# Patient Record
Sex: FEMALE | Race: WHITE | NOT HISPANIC OR LATINO | Employment: OTHER | URBAN - METROPOLITAN AREA
[De-identification: names, ages, dates, MRNs, and addresses within clinical notes are randomized per-mention and may not be internally consistent; named-entity substitution may affect disease eponyms.]

---

## 2018-04-15 PROCEDURE — 99285 EMERGENCY DEPT VISIT HI MDM: CPT

## 2018-04-16 ENCOUNTER — HOSPITAL ENCOUNTER (EMERGENCY)
Facility: HOSPITAL | Age: 83
Discharge: HOME | End: 2018-04-16
Attending: FAMILY MEDICINE
Payer: MEDICARE

## 2018-04-16 VITALS
SYSTOLIC BLOOD PRESSURE: 118 MMHG | OXYGEN SATURATION: 99 % | WEIGHT: 145 LBS | TEMPERATURE: 99 F | RESPIRATION RATE: 18 BRPM | HEART RATE: 87 BPM | DIASTOLIC BLOOD PRESSURE: 64 MMHG | BODY MASS INDEX: 24.89 KG/M2

## 2018-04-16 DIAGNOSIS — F10.920 ALCOHOLIC INTOXICATION WITHOUT COMPLICATION: ICD-10-CM

## 2018-04-16 DIAGNOSIS — S40.021A CONTUSION OF ARM, RIGHT, INITIAL ENCOUNTER: ICD-10-CM

## 2018-04-16 DIAGNOSIS — S00.83XA FACIAL CONTUSION, INITIAL ENCOUNTER: Primary | ICD-10-CM

## 2018-04-16 LAB
ALBUMIN SERPL BCP-MCNC: 3.9 G/DL
ALP SERPL-CCNC: 64 U/L
ALT SERPL W/O P-5'-P-CCNC: 27 U/L
ANION GAP SERPL CALC-SCNC: 10 MMOL/L
AST SERPL-CCNC: 35 U/L
BACTERIA #/AREA URNS HPF: NORMAL /HPF
BASOPHILS # BLD AUTO: 0.09 K/UL
BASOPHILS NFR BLD: 1.2 %
BILIRUB SERPL-MCNC: 0.4 MG/DL
BILIRUB UR QL STRIP: NEGATIVE
BUN SERPL-MCNC: 15 MG/DL
CALCIUM SERPL-MCNC: 9.4 MG/DL
CHLORIDE SERPL-SCNC: 106 MMOL/L
CLARITY UR: CLEAR
CO2 SERPL-SCNC: 27 MMOL/L
COLOR UR: YELLOW
CREAT SERPL-MCNC: 0.5 MG/DL
DIFFERENTIAL METHOD: NORMAL
EOSINOPHIL # BLD AUTO: 0.1 K/UL
EOSINOPHIL NFR BLD: 1.9 %
ERYTHROCYTE [DISTWIDTH] IN BLOOD BY AUTOMATED COUNT: 13.4 %
EST. GFR  (AFRICAN AMERICAN): >60 ML/MIN/1.73 M^2
EST. GFR  (NON AFRICAN AMERICAN): >60 ML/MIN/1.73 M^2
ETHANOL SERPL-MCNC: 352 MG/DL
GLUCOSE SERPL-MCNC: 105 MG/DL
GLUCOSE UR QL STRIP: NEGATIVE
HCT VFR BLD AUTO: 42.2 %
HGB BLD-MCNC: 14 G/DL
HGB UR QL STRIP: ABNORMAL
IMM GRANULOCYTES # BLD AUTO: 0.02 K/UL
IMM GRANULOCYTES NFR BLD AUTO: 0.3 %
KETONES UR QL STRIP: NEGATIVE
LEUKOCYTE ESTERASE UR QL STRIP: NEGATIVE
LYMPHOCYTES # BLD AUTO: 2.3 K/UL
LYMPHOCYTES NFR BLD: 31.2 %
MCH RBC QN AUTO: 31 PG
MCHC RBC AUTO-ENTMCNC: 33.2 G/DL
MCV RBC AUTO: 94 FL
MICROSCOPIC COMMENT: NORMAL
MONOCYTES # BLD AUTO: 0.7 K/UL
MONOCYTES NFR BLD: 9.8 %
NEUTROPHILS # BLD AUTO: 4.1 K/UL
NEUTROPHILS NFR BLD: 55.6 %
NITRITE UR QL STRIP: NEGATIVE
NRBC BLD-RTO: 0 /100 WBC
PH UR STRIP: 6 [PH] (ref 5–8)
PLATELET # BLD AUTO: 239 K/UL
PMV BLD AUTO: 10 FL
POTASSIUM SERPL-SCNC: 3.5 MMOL/L
PROT SERPL-MCNC: 6.8 G/DL
PROT UR QL STRIP: NEGATIVE
RBC # BLD AUTO: 4.51 M/UL
RBC #/AREA URNS HPF: 2 /HPF (ref 0–4)
SODIUM SERPL-SCNC: 143 MMOL/L
SP GR UR STRIP: <=1.005 (ref 1–1.03)
SQUAMOUS #/AREA URNS HPF: 2 /HPF
URN SPEC COLLECT METH UR: ABNORMAL
UROBILINOGEN UR STRIP-ACNC: NEGATIVE EU/DL
WBC # BLD AUTO: 7.31 K/UL

## 2018-04-16 PROCEDURE — 85025 COMPLETE CBC W/AUTO DIFF WBC: CPT

## 2018-04-16 PROCEDURE — 80053 COMPREHEN METABOLIC PANEL: CPT

## 2018-04-16 PROCEDURE — 81000 URINALYSIS NONAUTO W/SCOPE: CPT

## 2018-04-16 PROCEDURE — 80320 DRUG SCREEN QUANTALCOHOLS: CPT

## 2018-04-16 PROCEDURE — 25000003 PHARM REV CODE 250: Performed by: FAMILY MEDICINE

## 2018-04-16 RX ORDER — SODIUM CHLORIDE 9 MG/ML
1000 INJECTION, SOLUTION INTRAVENOUS
Status: COMPLETED | OUTPATIENT
Start: 2018-04-16 | End: 2018-04-16

## 2018-04-16 RX ADMIN — SODIUM CHLORIDE 1000 ML: 0.9 INJECTION, SOLUTION INTRAVENOUS at 12:04

## 2018-04-16 NOTE — ED PROVIDER NOTES
"Encounter Date: 4/15/2018       History   No chief complaint on file.    85 yo female was drinking vodka tonight and fell on face, no LOC, no N/V/D, no headache, no chills, no chest pain, no cough, pt's  is with her now and states that she "only had one airplane bottle of vodka" that I could find(he pulled one empty one from her purse)          Review of patient's allergies indicates:   Allergen Reactions    Dilantin [phenytoin sodium extended] Rash     Past Medical History:   Diagnosis Date    Seizures      Past Surgical History:   Procedure Laterality Date    ABDOMINAL HERNIA REPAIR      APPENDECTOMY      HERNIA REPAIR       No family history on file.  Social History   Substance Use Topics    Smoking status: Former Smoker    Smokeless tobacco: Not on file    Alcohol use 2.4 oz/week     4 Glasses of wine per week     Review of Systems   Constitutional: Negative for fever.   HENT: Negative for dental problem, ear discharge, ear pain and sore throat.    Eyes: Negative for pain.   Respiratory: Negative for shortness of breath.    Cardiovascular: Negative for chest pain.   Gastrointestinal: Negative for nausea.   Genitourinary: Negative for dysuria.   Musculoskeletal: Negative for back pain.   Skin: Negative for rash.   Neurological: Negative for weakness.   Hematological: Does not bruise/bleed easily.       Physical Exam     Initial Vitals [04/16/18 0005]   BP Pulse Resp Temp SpO2   118/64 72 18 98.7 °F (37.1 °C) 99 %      MAP       82         Physical Exam    Nursing note and vitals reviewed.  Constitutional: She appears well-developed and well-nourished. She is not diaphoretic. No distress.   HENT:   Head: Normocephalic and atraumatic.   Right Ear: External ear normal.   Left Ear: External ear normal.   Contusions to face, upper lip, teeth are intact   Eyes: Pupils are equal, round, and reactive to light. Right eye exhibits no discharge. Left eye exhibits no discharge.   Neck: No tracheal deviation " present. No JVD present.   Cardiovascular: Exam reveals no friction rub.    No murmur heard.  Pulmonary/Chest: No stridor. No respiratory distress. She has no wheezes. She has no rales.   Abdominal: Bowel sounds are normal. She exhibits no distension.   Musculoskeletal: Normal range of motion.   Neurological: She is alert.   Skin: Skin is warm.   Psychiatric: She has a normal mood and affect.         ED Course   Procedures  Labs Reviewed   ALCOHOL,MEDICAL (ETHANOL) - Abnormal; Notable for the following:        Result Value    Alcohol, Medical, Serum 352 (*)     All other components within normal limits    Narrative:     Etoh called to Cornelius Thomas rn, 04/16/2018 02:10   URINALYSIS - Abnormal; Notable for the following:     Occult Blood UA 2+ (*)     All other components within normal limits   CBC W/ AUTO DIFFERENTIAL   COMPREHENSIVE METABOLIC PANEL   URINALYSIS MICROSCOPIC             Medical Decision Making:   Clinical Tests:   Lab Tests: Ordered and Reviewed  ED Management:  Pt improved steadily in the ed,  indicates he can care for pt    Additional MDM:   Differential Diagnosis:   Other: The following diagnoses were also considered and will be evaluated: intracranial hemorrhage, contusions and drug overdose.                    Clinical Impression:   The primary encounter diagnosis was Facial contusion, initial encounter. Diagnoses of Alcoholic intoxication without complication and Contusion of arm, right, initial encounter were also pertinent to this visit.                           Fercho Bright MD  04/16/18 0316       Fercho Bright MD  04/16/18 0335       Fercho Bright MD  04/16/18 0337       Fercho Bright MD  04/16/18 0340

## 2021-01-15 ENCOUNTER — IMMUNIZATION (OUTPATIENT)
Dept: FAMILY MEDICINE | Facility: CLINIC | Age: 86
End: 2021-01-15
Payer: MEDICARE

## 2021-01-15 DIAGNOSIS — Z23 NEED FOR VACCINATION: Primary | ICD-10-CM

## 2021-01-15 PROCEDURE — 0011A COVID-19, MRNA, LNP-S, PF, 100 MCG/0.5 ML DOSE VACCINE: ICD-10-PCS | Mod: ,,, | Performed by: FAMILY MEDICINE

## 2021-01-15 PROCEDURE — 91301 COVID-19, MRNA, LNP-S, PF, 100 MCG/0.5 ML DOSE VACCINE: ICD-10-PCS | Mod: ,,, | Performed by: FAMILY MEDICINE

## 2021-01-15 PROCEDURE — 0011A COVID-19, MRNA, LNP-S, PF, 100 MCG/0.5 ML DOSE VACCINE: CPT | Mod: ,,, | Performed by: FAMILY MEDICINE

## 2021-01-15 PROCEDURE — 91301 COVID-19, MRNA, LNP-S, PF, 100 MCG/0.5 ML DOSE VACCINE: CPT | Mod: ,,, | Performed by: FAMILY MEDICINE

## 2021-02-12 ENCOUNTER — IMMUNIZATION (OUTPATIENT)
Dept: FAMILY MEDICINE | Facility: CLINIC | Age: 86
End: 2021-02-12
Payer: MEDICARE

## 2021-02-12 DIAGNOSIS — Z23 NEED FOR VACCINATION: Primary | ICD-10-CM

## 2021-02-12 PROCEDURE — 0012A COVID-19, MRNA, LNP-S, PF, 100 MCG/0.5 ML DOSE VACCINE: CPT | Mod: CV19,S$GLB,, | Performed by: FAMILY MEDICINE

## 2021-02-12 PROCEDURE — 91301 COVID-19, MRNA, LNP-S, PF, 100 MCG/0.5 ML DOSE VACCINE: ICD-10-PCS | Mod: S$GLB,,, | Performed by: FAMILY MEDICINE

## 2021-02-12 PROCEDURE — 0012A COVID-19, MRNA, LNP-S, PF, 100 MCG/0.5 ML DOSE VACCINE: ICD-10-PCS | Mod: CV19,S$GLB,, | Performed by: FAMILY MEDICINE

## 2021-02-12 PROCEDURE — 91301 COVID-19, MRNA, LNP-S, PF, 100 MCG/0.5 ML DOSE VACCINE: CPT | Mod: S$GLB,,, | Performed by: FAMILY MEDICINE

## 2021-11-30 ENCOUNTER — OFFICE VISIT (OUTPATIENT)
Dept: PODIATRY | Facility: CLINIC | Age: 86
End: 2021-11-30
Payer: MEDICARE

## 2021-11-30 VITALS
TEMPERATURE: 98 F | HEART RATE: 76 BPM | HEIGHT: 64 IN | WEIGHT: 125 LBS | DIASTOLIC BLOOD PRESSURE: 78 MMHG | BODY MASS INDEX: 21.34 KG/M2 | SYSTOLIC BLOOD PRESSURE: 128 MMHG

## 2021-11-30 DIAGNOSIS — L60.0 INGROWN NAIL: Primary | ICD-10-CM

## 2021-11-30 DIAGNOSIS — I73.9 PERIPHERAL VASCULAR DISEASE: ICD-10-CM

## 2021-11-30 PROCEDURE — 99203 PR OFFICE/OUTPT VISIT, NEW, LEVL III, 30-44 MIN: ICD-10-PCS | Mod: S$PBB,,, | Performed by: PODIATRIST

## 2021-11-30 PROCEDURE — 99213 OFFICE O/P EST LOW 20 MIN: CPT | Mod: PBBFAC,PN | Performed by: PODIATRIST

## 2021-11-30 PROCEDURE — 99203 OFFICE O/P NEW LOW 30 MIN: CPT | Mod: S$PBB,,, | Performed by: PODIATRIST

## 2021-11-30 PROCEDURE — 99999 PR PBB SHADOW E&M-EST. PATIENT-LVL III: ICD-10-PCS | Mod: PBBFAC,,, | Performed by: PODIATRIST

## 2021-11-30 PROCEDURE — 99999 PR PBB SHADOW E&M-EST. PATIENT-LVL III: CPT | Mod: PBBFAC,,, | Performed by: PODIATRIST

## 2021-11-30 RX ORDER — DONEPEZIL HYDROCHLORIDE 10 MG/1
10 TABLET, FILM COATED ORAL
COMMUNITY
Start: 2021-11-23 | End: 2023-05-10

## 2021-12-03 PROBLEM — L60.0 INGROWN NAIL: Status: ACTIVE | Noted: 2021-12-03

## 2021-12-03 PROBLEM — I73.9 PERIPHERAL VASCULAR DISEASE: Status: ACTIVE | Noted: 2021-12-03

## 2021-12-23 ENCOUNTER — IMMUNIZATION (OUTPATIENT)
Dept: FAMILY MEDICINE | Facility: CLINIC | Age: 86
End: 2021-12-23
Payer: MEDICARE

## 2021-12-23 DIAGNOSIS — Z23 NEED FOR VACCINATION: Primary | ICD-10-CM

## 2021-12-23 PROCEDURE — 0064A COVID-19, MRNA, LNP-S, PF, 100 MCG/0.25 ML DOSE VACCINE (MODERNA BOOSTER): CPT | Mod: PBBFAC

## 2022-09-20 ENCOUNTER — OFFICE VISIT (OUTPATIENT)
Dept: PODIATRY | Facility: CLINIC | Age: 87
End: 2022-09-20
Payer: MEDICARE

## 2022-09-20 VITALS
HEART RATE: 77 BPM | HEIGHT: 64 IN | SYSTOLIC BLOOD PRESSURE: 115 MMHG | TEMPERATURE: 98 F | WEIGHT: 124 LBS | BODY MASS INDEX: 21.17 KG/M2 | DIASTOLIC BLOOD PRESSURE: 69 MMHG

## 2022-09-20 DIAGNOSIS — I73.9 PERIPHERAL VASCULAR DISEASE: ICD-10-CM

## 2022-09-20 DIAGNOSIS — L60.0 INGROWN NAIL: Primary | ICD-10-CM

## 2022-09-20 PROCEDURE — 99213 OFFICE O/P EST LOW 20 MIN: CPT | Mod: S$PBB,,, | Performed by: PODIATRIST

## 2022-09-20 PROCEDURE — 99999 PR PBB SHADOW E&M-EST. PATIENT-LVL III: CPT | Mod: PBBFAC,,, | Performed by: PODIATRIST

## 2022-09-20 PROCEDURE — 99213 OFFICE O/P EST LOW 20 MIN: CPT | Mod: PBBFAC,PN | Performed by: PODIATRIST

## 2022-09-20 PROCEDURE — 99213 PR OFFICE/OUTPT VISIT, EST, LEVL III, 20-29 MIN: ICD-10-PCS | Mod: S$PBB,,, | Performed by: PODIATRIST

## 2022-09-20 PROCEDURE — 99999 PR PBB SHADOW E&M-EST. PATIENT-LVL III: ICD-10-PCS | Mod: PBBFAC,,, | Performed by: PODIATRIST

## 2022-09-22 ENCOUNTER — OFFICE VISIT (OUTPATIENT)
Dept: GASTROENTEROLOGY | Facility: CLINIC | Age: 87
End: 2022-09-22
Payer: MEDICARE

## 2022-09-22 VITALS
HEART RATE: 74 BPM | DIASTOLIC BLOOD PRESSURE: 73 MMHG | SYSTOLIC BLOOD PRESSURE: 130 MMHG | OXYGEN SATURATION: 97 % | RESPIRATION RATE: 16 BRPM | WEIGHT: 124.31 LBS | BODY MASS INDEX: 21.22 KG/M2 | HEIGHT: 64 IN

## 2022-09-22 DIAGNOSIS — K59.00 CONSTIPATION, UNSPECIFIED CONSTIPATION TYPE: Primary | ICD-10-CM

## 2022-09-22 PROCEDURE — 99204 OFFICE O/P NEW MOD 45 MIN: CPT | Mod: S$PBB,,, | Performed by: INTERNAL MEDICINE

## 2022-09-22 PROCEDURE — 99999 PR PBB SHADOW E&M-EST. PATIENT-LVL IV: CPT | Mod: PBBFAC,,, | Performed by: INTERNAL MEDICINE

## 2022-09-22 PROCEDURE — 99204 PR OFFICE/OUTPT VISIT, NEW, LEVL IV, 45-59 MIN: ICD-10-PCS | Mod: S$PBB,,, | Performed by: INTERNAL MEDICINE

## 2022-09-22 PROCEDURE — 99214 OFFICE O/P EST MOD 30 MIN: CPT | Mod: PBBFAC,PN | Performed by: INTERNAL MEDICINE

## 2022-09-22 PROCEDURE — 99999 PR PBB SHADOW E&M-EST. PATIENT-LVL IV: ICD-10-PCS | Mod: PBBFAC,,, | Performed by: INTERNAL MEDICINE

## 2022-09-22 RX ORDER — DOCUSATE SODIUM 100 MG/1
100 CAPSULE, LIQUID FILLED ORAL 2 TIMES DAILY
COMMUNITY
End: 2023-05-10

## 2022-09-22 RX ORDER — FEXOFENADINE HCL 60 MG
60 TABLET ORAL DAILY
COMMUNITY

## 2022-09-22 NOTE — PROGRESS NOTES
Subjective:       Patient ID: Tasha Benson is a 88 y.o. female.    Chief Complaint: Abdominal Cramping and Abdominal Pain    She is here with her 2 daughters.  They are  concerned about the mothers bowel function.  Apparently she goes into the bathroom by herself.  They do not know her stool appearance.  Apparently they hear their mother making various sounds and tapping on her abdomen.  Apparently she uses a roll of toilet tissue per day.  The patient denies GI bleeding.  She believes that she has daily bowel movements.  She has expressed an uncomfortable sensation of the abdomen to the daughters but she denies abdominal pain.  She has dementia.  She had some type of intestinal surgery 65 years ago.  She has been on the stool softeners , the Colace and also the Gas-X.  Plain films of the abdomen date  03/22 2022 revealed increased fecal contents in the rectosigmoid.    Allergies:  Review of patient's allergies indicates:   Allergen Reactions    Dilantin [phenytoin sodium extended] Rash       Medications:    Current Outpatient Medications:     calcium-vitamin D 250-100 mg-unit per tablet, Take 1 tablet by mouth daily as needed., Disp: , Rfl:     docusate sodium (COLACE) 100 MG capsule, Take 100 mg by mouth 2 (two) times daily., Disp: , Rfl:     donepeziL (ARICEPT) 10 MG tablet, 10 mg., Disp: , Rfl:     fexofenadine (ALLEGRA) 60 MG tablet, Take 60 mg by mouth once daily., Disp: , Rfl:     Lactobacillus acidophilus (ACIDOPHILUS ORAL), Take by mouth., Disp: , Rfl:     multivitamin (THERAGRAN) per tablet, Take 1 tablet by mouth once daily., Disp: , Rfl:     niacin, inositol niacinate, 500 mg Cap, Take 1 capsule by mouth once daily., Disp: , Rfl:     phenobarbital (LUMINAL) 30 MG tablet, Take 120 mg by mouth every evening.  , Disp: , Rfl:     urea (CARMOL) 40 % Crea, Apply topically once daily., Disp: 185 g, Rfl: 11    Past Medical History:   Diagnosis Date    Seizures     Unspecified dementia without behavioral  "disturbance        Past Surgical History:   Procedure Laterality Date    ABDOMINAL HERNIA REPAIR      APPENDECTOMY      HERNIA REPAIR           Review of Systems   Constitutional:  Negative for appetite change, fever and unexpected weight change.   HENT:  Negative for trouble swallowing.         No jaundice.   Respiratory:  Negative for cough, shortness of breath and wheezing.         She is a former cigarette smoker.  Currently she does not use tobacco.  She denies dysphagia aspiration hemoptysis chronic cough chronic sputum production or dyspnea on exertion.   Cardiovascular:  Negative for chest pain.        She denies exertional chest pain or cardiac arrhythmia is.   Gastrointestinal:  Positive for abdominal pain and constipation. Negative for abdominal distention, anal bleeding, blood in stool, diarrhea, nausea, rectal pain and vomiting.        She  consumes a small amount of wine per day.  Two years ago she was having falling episodes and was consuming increased amounts of alcohol.  Her daughter is the main caregiver and they regulate the medicines and she is eating well.  She has not had dysphagia aspiration acute obstruction.  They have not seen GI bleeding.   Musculoskeletal:  Positive for arthralgias. Negative for back pain and neck pain.   Skin:  Negative for pallor and rash.   Neurological:  Negative for dizziness, seizures, syncope, speech difficulty, weakness and numbness.        She is followed by the neurologist for the dementia.  She has remote history of seizures but the daughter states she has not had a seizure in years.   Hematological:  Negative for adenopathy.   Psychiatric/Behavioral:  Negative for confusion.      Objective:      Physical Exam  Vitals reviewed.   Constitutional:       Appearance: She is well-developed.      Comments: Well-nourished well-hydrated thin elderly afebrile elderly white female.  She is sitting comfortably in the chair.  She is breathing normally.  She states "I am " "doing fine ".  She she denies GI symptoms.  She is normocephalic.  Pupils are normal.  She has some difficulty with responding to questions.  She is slow to answer but the she appears to answer appropriately to direct questions.  She appears to be oriented.   HENT:      Head: Normocephalic.   Eyes:      Pupils: Pupils are equal, round, and reactive to light.   Neck:      Thyroid: No thyromegaly.      Trachea: No tracheal deviation.   Cardiovascular:      Rate and Rhythm: Normal rate and regular rhythm.      Heart sounds: Normal heart sounds.   Pulmonary:      Effort: Pulmonary effort is normal.      Breath sounds: Normal breath sounds.   Abdominal:      General: Bowel sounds are normal. There is no distension.      Palpations: Abdomen is soft. There is no mass.      Tenderness: There is no abdominal tenderness. There is no guarding or rebound.      Hernia: No hernia is present.      Comments: The abdomen is soft without tenderness masses organomegaly.  Bowel sounds are normal.   Musculoskeletal:      Cervical back: Normal range of motion and neck supple.      Comments: She ambulates slowly with assistance.  She can go from the sitting to the standing position with assistance.  She needs some assistance to get off and on the exam table.   Lymphadenopathy:      Cervical: No cervical adenopathy.   Skin:     General: Skin is warm and dry.   Neurological:      Mental Status: She is alert and oriented to person, place, and time.      Cranial Nerves: No cranial nerve deficit.   Psychiatric:         Behavior: Behavior normal.         Plan:       Constipation, unspecified constipation type  -     CBC Auto Differential; Future; Expected date: 09/22/2022  -     Comprehensive Metabolic Panel; Future; Expected date: 09/22/2022  -     CT Enterography Abd_Pelvis With Contrast; Future; Expected date: 09/22/2022  -     POCT Occult Blood Stool          She will continue current medications and follow up with her other physicians " including the neurologist.  The patient and her daughters do not want a colonoscopy.  They are willing to have labs and a CT scan.  The daughter is the main caregiver and she will make sure that the mother takes her medicines appropriately and does not consume excessive alcohol.  She will take her vitamins minerals current medications and add more vegetables to the diet.

## 2022-09-22 NOTE — PATIENT INSTRUCTIONS
She will continue her nutritious diet.  She can continue the Colace and the Gas-X.  CT enterography and labs will be obtained.

## 2022-09-23 PROBLEM — K59.00 CONSTIPATION: Status: ACTIVE | Noted: 2022-09-23

## 2022-09-23 NOTE — PROGRESS NOTES
Subjective:       Patient ID: Tasha Benson is a 88 y.o. female.    Chief Complaint: Nail Problem       Patient is complaining of ingrowing toenails on both big toes they have been causing her discomfort.  Past Medical History:   Diagnosis Date    Seizures     Unspecified dementia without behavioral disturbance      Past Surgical History:   Procedure Laterality Date    ABDOMINAL HERNIA REPAIR      APPENDECTOMY      HERNIA REPAIR       History reviewed. No pertinent family history.  Social History     Socioeconomic History    Marital status:    Tobacco Use    Smoking status: Former    Smokeless tobacco: Never   Substance and Sexual Activity    Alcohol use: Yes     Alcohol/week: 4.0 standard drinks     Types: 4 Glasses of wine per week    Drug use: No    Sexual activity: Not Currently       Current Outpatient Medications   Medication Sig Dispense Refill    calcium-vitamin D 250-100 mg-unit per tablet Take 1 tablet by mouth daily as needed.      donepeziL (ARICEPT) 10 MG tablet 10 mg.      multivitamin (THERAGRAN) per tablet Take 1 tablet by mouth once daily.      niacin, inositol niacinate, 500 mg Cap Take 1 capsule by mouth once daily.      phenobarbital (LUMINAL) 30 MG tablet Take 120 mg by mouth every evening.        urea (CARMOL) 40 % Crea Apply topically once daily. 185 g 11    docusate sodium (COLACE) 100 MG capsule Take 100 mg by mouth 2 (two) times daily.      fexofenadine (ALLEGRA) 60 MG tablet Take 60 mg by mouth once daily.      Lactobacillus acidophilus (ACIDOPHILUS ORAL) Take by mouth.       No current facility-administered medications for this visit.     Review of patient's allergies indicates:   Allergen Reactions    Dilantin [phenytoin sodium extended] Rash       Review of Systems   Musculoskeletal:  Positive for arthralgias, gait problem and joint swelling.   All other systems reviewed and are negative.    Objective:      Vitals:    09/20/22 1540   BP: 115/69   Pulse: 77   Temp: 97.6 °F (36.4  "°C)   Weight: 56.2 kg (124 lb)   Height: 5' 4" (1.626 m)     Physical Exam  Vitals and nursing note reviewed.   Constitutional:       Appearance: Normal appearance.   Cardiovascular:      Pulses:           Dorsalis pedis pulses are 1+ on the right side and 1+ on the left side.        Posterior tibial pulses are 0 on the right side and 0 on the left side.   Pulmonary:      Effort: Pulmonary effort is normal.   Musculoskeletal:         General: Tenderness and deformity present. Normal range of motion.   Feet:      Right foot:      Protective Sensation: 2 sites tested.  2 sites sensed.      Skin integrity: Erythema, callus and dry skin present.      Toenail Condition: Right toenails are abnormally thick, long and ingrown. Fungal disease present.     Left foot:      Protective Sensation: 2 sites tested.  2 sites sensed.      Skin integrity: Erythema, callus and dry skin present.      Toenail Condition: Left toenails are abnormally thick, long and ingrown. Fungal disease present.  Skin:     Capillary Refill: Capillary refill takes more than 3 seconds.      Findings: Erythema present.   Neurological:      General: No focal deficit present.      Mental Status: She is alert.   Psychiatric:         Mood and Affect: Mood normal.         Behavior: Behavior normal.         Thought Content: Thought content normal.         Judgment: Judgment normal.                        Assessment:       1. Ingrown nail    2. Peripheral vascular disease        Plan:          Patient is complaining of ingrowing toenails on both big toes they have been causing her discomfort.  A comprehensive new patient evaluation was performed today patient does have significant pain and discomfort on examination of both great toes many of the patient's nails are elongated however the big toes are also ingrowing into both the medial lateral border the causing patient discomfort and at risk for infection.  Patient did not require a nail avulsion at this time I " was able to aggressively trim and debride the ingrowing nails which offer the patient relief certainly would not want to undergo a nail avulsion unless absolutely necessary as the patient does have significant peripheral vascular disease which could lead to nonhealing following a nail avulsion.  Patient indicated the areas felt much better following debridement these need to be monitored closely I have recommended the application of Vicks vapor rub twice a day every day which will help combat the fungal infection ultimately the fungal infection has caused the ingrowing toenail which can lead to a bacterial infection.  Recommended follow-up will be as needed patient advised if she is not pain-free from the ingrowing toenail areas where they were trimmed and removed in 4-5 days to contact us for follow-up further evaluation and treatment.  Patient has a small mole present on the 1st webspace of the left foot this is something to monitor I did advised the patient and the patient's family member of this.  I did recommend the application of Vicks vapor rub again to help control the fungal involvement.  Time including discussion evaluation discussion of treatment options treatment plan new patient evaluation removal of ingrowing toenails and documentation of today's visit equaled 20 minutes.This note was created using [employer] voice recognition software that occasionally misinterpreted phrases or words.

## 2022-09-28 ENCOUNTER — HOSPITAL ENCOUNTER (OUTPATIENT)
Dept: RADIOLOGY | Facility: HOSPITAL | Age: 87
Discharge: HOME | End: 2022-09-28
Attending: INTERNAL MEDICINE
Payer: MEDICARE

## 2022-09-28 DIAGNOSIS — K59.00 CONSTIPATION, UNSPECIFIED CONSTIPATION TYPE: ICD-10-CM

## 2022-09-28 PROCEDURE — 25500020 PHARM REV CODE 255: Performed by: INTERNAL MEDICINE

## 2022-09-28 PROCEDURE — 74177 CT ABD & PELVIS W/CONTRAST: CPT | Mod: TC

## 2022-09-28 PROCEDURE — A9698 NON-RAD CONTRAST MATERIALNOC: HCPCS | Performed by: INTERNAL MEDICINE

## 2022-09-28 PROCEDURE — 74177 CT ENTEROGRAPHY ABD_PELVIS WITH CONTRAST: ICD-10-PCS | Mod: 26,,, | Performed by: RADIOLOGY

## 2022-09-28 PROCEDURE — 74177 CT ABD & PELVIS W/CONTRAST: CPT | Mod: 26,,, | Performed by: RADIOLOGY

## 2022-09-28 RX ADMIN — IOHEXOL 75 ML: 350 INJECTION, SOLUTION INTRAVENOUS at 03:09

## 2022-09-28 RX ADMIN — BARIUM SULFATE 1350 ML: 1 SUSPENSION ORAL at 03:09

## 2022-09-29 ENCOUNTER — TELEPHONE (OUTPATIENT)
Dept: GASTROENTEROLOGY | Facility: CLINIC | Age: 87
End: 2022-09-29
Payer: MEDICARE

## 2022-09-29 NOTE — TELEPHONE ENCOUNTER
----- Message from Margie Ashby sent at 9/29/2022 12:15 PM CDT -----  Who Called:     What is the reqeust in detail: Requesting call back to reschedule 10/07/22 due to transportation. Please advise.     Can the clinic reply by [facility]? No    Best Call Back Number: [phone] please leave message or [phone]    Additional Information:

## 2022-09-30 ENCOUNTER — PATIENT MESSAGE (OUTPATIENT)
Dept: GASTROENTEROLOGY | Facility: CLINIC | Age: 87
End: 2022-09-30
Payer: MEDICARE

## 2022-09-30 DIAGNOSIS — Q44.5 CONGENITAL MALFORMATIONS OF GALLBLADDER, BILE DUCTS AND LIVER: Primary | ICD-10-CM

## 2022-09-30 DIAGNOSIS — N13.30 HYDRONEPHROSIS, UNSPECIFIED HYDRONEPHROSIS TYPE: ICD-10-CM

## 2022-09-30 DIAGNOSIS — Q44.70 CONGENITAL MALFORMATIONS OF GALLBLADDER, BILE DUCTS AND LIVER: Primary | ICD-10-CM

## 2022-09-30 DIAGNOSIS — Q44.1 CONGENITAL MALFORMATIONS OF GALLBLADDER, BILE DUCTS AND LIVER: Primary | ICD-10-CM

## 2022-09-30 NOTE — TELEPHONE ENCOUNTER
Attempted to contact Tasha Benson to discuss results.    Left voice mail to return our call at [phone] on [phone] (home).    Jennyfer Martin MA

## 2022-10-06 DIAGNOSIS — N13.30 HYDRONEPHROSIS, UNSPECIFIED HYDRONEPHROSIS TYPE: Primary | ICD-10-CM

## 2022-10-07 ENCOUNTER — TELEPHONE (OUTPATIENT)
Dept: FAMILY MEDICINE | Facility: CLINIC | Age: 87
End: 2022-10-07
Payer: MEDICARE

## 2022-10-10 ENCOUNTER — PATIENT MESSAGE (OUTPATIENT)
Dept: GASTROENTEROLOGY | Facility: CLINIC | Age: 87
End: 2022-10-10
Payer: MEDICARE

## 2022-10-11 ENCOUNTER — PATIENT MESSAGE (OUTPATIENT)
Dept: GASTROENTEROLOGY | Facility: CLINIC | Age: 87
End: 2022-10-11
Payer: MEDICARE

## 2022-10-17 ENCOUNTER — TELEPHONE (OUTPATIENT)
Dept: GASTROENTEROLOGY | Facility: CLINIC | Age: 87
End: 2022-10-17
Payer: MEDICARE

## 2022-10-17 NOTE — TELEPHONE ENCOUNTER
Returned call. LVM for patient to call back to be scheduled to go over test results.    ----- Message from Chet Fournier sent at 10/4/2022  3:21 PM CDT -----  Type:  Test Results    Who Called:  Pt spouse     Name of Test  CT Enterography Abd_Pelvis With Contrast     Date of Test: 9/28/22    Ordering Provider: Eliud Childs     Where the test was performed: [facility], Radiology    Would the patient rather a call back or a response via [facility]? Call back     Best Call Back Number : (mobile) [phone]     Additional Information:

## 2022-10-18 ENCOUNTER — TELEPHONE (OUTPATIENT)
Dept: FAMILY MEDICINE | Facility: CLINIC | Age: 87
End: 2022-10-18
Payer: MEDICARE

## 2022-10-18 NOTE — TELEPHONE ENCOUNTER
----- Message from Jerome Guillen sent at 10/18/2022  4:39 PM CDT -----  Who Called:       What is the reqeust in detail:requesting call back to have orders placed for scheduling a cT scan      Can the clinic reply by [facility]? NO       Best Call Back Number:[phone]      Additional Information:

## 2022-10-18 NOTE — TELEPHONE ENCOUNTER
Attempted to contact Tasha Benson to discuss  scheduling CT .    Unable to leave message on phone - no voicemail or mailbox full on [phone] due to number being disconnected.    Sandra Garcia LPN

## 2022-10-19 DIAGNOSIS — Q44.1 CONGENITAL MALFORMATIONS OF GALLBLADDER, BILE DUCTS AND LIVER: Primary | ICD-10-CM

## 2022-10-19 DIAGNOSIS — R93.5 ABNORMAL FINDINGS ON DIAGNOSTIC IMAGING OF OTHER ABDOMINAL REGIONS, INCLUDING RETROPERITONEUM: Primary | ICD-10-CM

## 2022-10-19 DIAGNOSIS — R93.5 ABNORMAL FINDINGS ON DIAGNOSTIC IMAGING OF OTHER ABDOMINAL REGIONS, INCLUDING RETROPERITONEUM: ICD-10-CM

## 2022-10-19 DIAGNOSIS — Q44.5 CONGENITAL MALFORMATIONS OF GALLBLADDER, BILE DUCTS AND LIVER: Primary | ICD-10-CM

## 2022-10-19 DIAGNOSIS — Q44.70 CONGENITAL MALFORMATIONS OF GALLBLADDER, BILE DUCTS AND LIVER: Primary | ICD-10-CM

## 2022-11-03 ENCOUNTER — HOSPITAL ENCOUNTER (OUTPATIENT)
Dept: RADIOLOGY | Facility: HOSPITAL | Age: 87
Discharge: HOME | End: 2022-11-03
Attending: INTERNAL MEDICINE
Payer: MEDICARE

## 2022-11-03 DIAGNOSIS — R93.5 ABNORMAL FINDINGS ON DIAGNOSTIC IMAGING OF OTHER ABDOMINAL REGIONS, INCLUDING RETROPERITONEUM: ICD-10-CM

## 2022-11-03 DIAGNOSIS — Q44.5 CONGENITAL MALFORMATIONS OF GALLBLADDER, BILE DUCTS AND LIVER: ICD-10-CM

## 2022-11-03 DIAGNOSIS — Q44.1 CONGENITAL MALFORMATIONS OF GALLBLADDER, BILE DUCTS AND LIVER: ICD-10-CM

## 2022-11-03 DIAGNOSIS — Q44.70 CONGENITAL MALFORMATIONS OF GALLBLADDER, BILE DUCTS AND LIVER: ICD-10-CM

## 2022-11-03 PROCEDURE — 74181 MRI ABDOMEN W/O CONTRAST: CPT | Mod: TC

## 2022-11-03 PROCEDURE — 76376 MRI ABDOMEN WITHOUT CONTRAST MRCP: ICD-10-PCS | Mod: 26,,, | Performed by: RADIOLOGY

## 2022-11-03 PROCEDURE — 76376 3D RENDER W/INTRP POSTPROCES: CPT | Mod: 26,,, | Performed by: RADIOLOGY

## 2022-11-03 PROCEDURE — 74181 MRI ABDOMEN W/O CONTRAST: CPT | Mod: 26,,, | Performed by: RADIOLOGY

## 2022-11-03 PROCEDURE — 74181 MRI ABDOMEN WITHOUT CONTRAST MRCP: ICD-10-PCS | Mod: 26,,, | Performed by: RADIOLOGY

## 2022-11-08 ENCOUNTER — TELEPHONE (OUTPATIENT)
Dept: UROLOGY | Facility: CLINIC | Age: 87
End: 2022-11-08
Payer: MEDICARE

## 2022-11-08 NOTE — TELEPHONE ENCOUNTER
----- Message from Kavya Benitez sent at 11/8/2022  8:39 AM CST -----  Contact:   Type:  Patient Returning Call    Who Called:   Jean-Pierre  Who Left Message for Patient:  not sure maybe Jennyfer  Does the patient know what this is regarding?:  not sure  Best Call Back Number:  [phone]  Additional Information:  Thanks

## 2022-11-17 ENCOUNTER — TELEPHONE (OUTPATIENT)
Dept: GASTROENTEROLOGY | Facility: CLINIC | Age: 87
End: 2022-11-17
Payer: MEDICARE

## 2022-11-17 ENCOUNTER — OFFICE VISIT (OUTPATIENT)
Dept: UROLOGY | Facility: CLINIC | Age: 87
End: 2022-11-17
Payer: MEDICARE

## 2022-11-17 VITALS
WEIGHT: 125 LBS | BODY MASS INDEX: 21.34 KG/M2 | SYSTOLIC BLOOD PRESSURE: 157 MMHG | HEART RATE: 75 BPM | DIASTOLIC BLOOD PRESSURE: 76 MMHG | HEIGHT: 64 IN

## 2022-11-17 DIAGNOSIS — N13.30 HYDRONEPHROSIS, UNSPECIFIED HYDRONEPHROSIS TYPE: Primary | ICD-10-CM

## 2022-11-17 DIAGNOSIS — R10.9 ABDOMINAL PAIN, UNSPECIFIED ABDOMINAL LOCATION: ICD-10-CM

## 2022-11-17 DIAGNOSIS — N32.81 OAB (OVERACTIVE BLADDER): ICD-10-CM

## 2022-11-17 DIAGNOSIS — R39.15 URGENCY OF URINATION: ICD-10-CM

## 2022-11-17 LAB
BILIRUBIN, UA POC OHS: NEGATIVE
BLOOD, UA POC OHS: NEGATIVE
CLARITY, UA POC OHS: CLEAR
COLOR, UA POC OHS: YELLOW
GLUCOSE, UA POC OHS: NEGATIVE
KETONES, UA POC OHS: NEGATIVE
LEUKOCYTES, UA POC OHS: NEGATIVE
NITRITE, UA POC OHS: NEGATIVE
PH, UA POC OHS: 6
POC RESIDUAL URINE VOLUME: 32 ML (ref 0–100)
PROTEIN, UA POC OHS: NEGATIVE
SPECIFIC GRAVITY, UA POC OHS: 1.02
UROBILINOGEN, UA POC OHS: 0.2

## 2022-11-17 PROCEDURE — 99999 PR PBB SHADOW E&M-EST. PATIENT-LVL IV: CPT | Mod: PBBFAC,,, | Performed by: UROLOGY

## 2022-11-17 PROCEDURE — 99214 OFFICE O/P EST MOD 30 MIN: CPT | Mod: PBBFAC,PN | Performed by: UROLOGY

## 2022-11-17 PROCEDURE — 81003 URINALYSIS AUTO W/O SCOPE: CPT | Mod: PBBFAC,PN | Performed by: UROLOGY

## 2022-11-17 PROCEDURE — 99204 PR OFFICE/OUTPT VISIT, NEW, LEVL IV, 45-59 MIN: ICD-10-PCS | Mod: S$PBB,,, | Performed by: UROLOGY

## 2022-11-17 PROCEDURE — 51798 US URINE CAPACITY MEASURE: CPT | Mod: PBBFAC,PN | Performed by: UROLOGY

## 2022-11-17 PROCEDURE — 99999 PR PBB SHADOW E&M-EST. PATIENT-LVL IV: ICD-10-PCS | Mod: PBBFAC,,, | Performed by: UROLOGY

## 2022-11-17 PROCEDURE — 99204 OFFICE O/P NEW MOD 45 MIN: CPT | Mod: S$PBB,,, | Performed by: UROLOGY

## 2022-11-17 RX ORDER — POLYETHYLENE GLYCOL 3350 17 G/17G
POWDER, FOR SOLUTION ORAL
COMMUNITY

## 2022-11-17 RX ORDER — MIRABEGRON 50 MG/1
50 TABLET, FILM COATED, EXTENDED RELEASE ORAL EVERY MORNING
Qty: 90 TABLET | Refills: 3 | Status: SHIPPED | OUTPATIENT
Start: 2022-11-17 | End: 2023-05-10

## 2022-11-17 NOTE — PROGRESS NOTES
[facility] Note - [place]  Staff: MD Denice  PCP: Neva Cherry NP  Date of Service: 11/17/2022      Subjective:     HPI: Tasha Benson is a 88 y.o. female     Initial consult by me in clinic on 11/17/22 for mild hydro seen on ct:    She was referred to me because she was found to have mild bilateral hydroureteronephrosis her CT enterography done 09/28/2022 for persistent abdominal pain ordered by her gastroenterologist who has since retired.  Review of the CT shows mildly distended bladder with mild hydro.  CT head also showed a liver mass that needed further evaluation.  And a left upper pole cyst.  She also had significant rectal distention.    Her gastroenterologist then ordered an MRI MRCP which confirmed that the liver mass was hemangioma.  It also showed that the hydronephrosis had resolved.  However it did not show the rectum.    Per family she was then started on daily constipation regimen with MiraLax.  She is had a bowel movement daily.  However she has significant urge to urinate or have a bowel movement every hour to 2 hours.  Since then she is continued to have abdominal pain daily.  She spends over 6 hours in the bathroom and uses over bowls toilet paper.  She also has some dementia.  No pads.  No urine or stool incontinence.    UA today negative.  PVR by scan 32.    Ct enterography 9/28/22                  Mri mrcp 11/3/22: no hydro seen.             The most bothersome issue for the patient today is feeling urge to urinate or have bm frequently.     Urine history:  11/17/22 Neg, pvr by scan: 32    REVIEW OF SYSTEMS:  Negative except for as stated above    Past Medical History:   Diagnosis Date    Seizures     Unspecified dementia without behavioral disturbance        Past Surgical History:   Procedure Laterality Date    ABDOMINAL HERNIA REPAIR      APPENDECTOMY      HERNIA REPAIR         Objective:     Vitals:    11/17/22 1154   BP: (!) 157/76   Pulse: 75       Focused  Gu exam: neg      Assessment:     Tasha Benson is a 88 y.o. female with       1. Hydronephrosis, unspecified hydronephrosis type    2. Urgency of urination    3. Abdominal pain, unspecified abdominal location          Plan:     Hydronephrosis resolved based on most recent MRI, suspect was due to significant abdominal rectal distention at that time.    Start myrbetriq 50 mg once daily to see if it helps with feeling of urge to urinate all the time.  However it might be too expensive and if she still has significant rectal distention could be the cause.    Referring to gastroenterology.  Message sent to Dr. Barraza and Dr. Trivedi.  Her gastroenterologist retired before being able to review the CT results with them showing significant rectal distention.  With no improvement of her symptoms despite constipation treatment and persistent abdominal pain would refer back to them to see if they have any other suggestions.    If she continues to have significant urinary urgency and frequency despite constipation treatment and evaluation by Gastroenterology and if myrbetriq not covered or does not work she can return to see me and we can discuss some other options.  Limited with medications due to dementia and risk of constipation.  However there are some procedures that might help - PTNS    F/u urology NP and if no improvement in 3months- see abovement. Refer to urogyn for ptns. Discuss ptns with her at that appt     Shauna Galdamez MD

## 2022-11-17 NOTE — TELEPHONE ENCOUNTER
----- Message from Shauna Galdamez MD sent at 11/17/2022  1:21 PM CST -----  Pt has abdominal pain.  Dr. Childs has since retired.  He had ordered a CT scan but did not really review it with her after her words. Significant rectal distension from stool.   Because of her persistent abdominal pain would like to see if she could be seen by  or dr smith

## 2022-11-17 NOTE — TELEPHONE ENCOUNTER
Reached out to patient to make her or her spouse aware of provider's next available. No answer. Voicemail left.

## 2022-11-17 NOTE — TELEPHONE ENCOUNTER
Returned call to patient to assist. Patient would liek to make a new patient appointment. Appointment made with Dr. Trivedi on 1/12/2023.

## 2022-11-17 NOTE — PATIENT INSTRUCTIONS
1. Hydronephrosis, unspecified hydronephrosis type    2. Urgency of urination    3. Abdominal pain, unspecified abdominal location    Hydronephrosis resolved based on most recent MRI, suspect was due to significant abdominal rectal distention at that time.    Start myrbetriq 50 mg once daily to see if it helps with feeling of urge to urinate all the time.  However it might be too expensive and if she still has significant rectal distention could be the cause.    Referring to gastroenterology.  Message sent to Dr. Barraza and Dr. Trivedi.  Her gastroenterologist retired before being able to review the CT results with them showing significant rectal distention.  With no improvement of her symptoms despite constipation treatment and persistent abdominal pain would refer back to them to see if they have any other suggestions.    If she continues to have significant urinary urgency and frequency despite constipation treatment and evaluation by Gastroenterology and if myrbetriq not covered or does not work she can return to see me and we can discuss some other options.  Limited with medications due to dementia and risk of constipation.  However there are some procedures that might help - PTNS    F/u urology NP and if no improvement - see abovement. Refer to urogyn for ptns. Discuss ptns with her at that appt

## 2022-11-17 NOTE — TELEPHONE ENCOUNTER
Patient already scheduled to see Dr. Trivedi. Per Dr. Trivedi patient to be offered appointment with next available provider. Appointment made with NP for 12/6.

## 2022-11-17 NOTE — TELEPHONE ENCOUNTER
----- Message from Galindo Trivedi MD sent at 11/17/2022  4:31 PM CST -----  Office with next available provider.      ----- Message -----  From: Shauna Galdamez MD  Sent: 11/17/2022   1:31 PM CST  To: Galindo Trivedi MD, Becky Mariscal MD    Referring to GI

## 2022-11-17 NOTE — TELEPHONE ENCOUNTER
----- Message from Kaylen Aquino sent at 11/17/2022  3:06 PM CST -----  Regarding: returning call  Contact: Patient  Type:  Patient Returning Call    Who Called:  Patient  Who Left Message for Patient:  notsure  Does the patient know what this is regarding?:  no  Best Call Back Number:  [phone] (home) Telephone Information:

## 2022-12-06 ENCOUNTER — OFFICE VISIT (OUTPATIENT)
Dept: GASTROENTEROLOGY | Facility: CLINIC | Age: 87
End: 2022-12-06
Payer: MEDICARE

## 2022-12-06 VITALS
WEIGHT: 127.63 LBS | HEIGHT: 64 IN | DIASTOLIC BLOOD PRESSURE: 63 MMHG | SYSTOLIC BLOOD PRESSURE: 112 MMHG | HEART RATE: 99 BPM | BODY MASS INDEX: 21.79 KG/M2

## 2022-12-06 DIAGNOSIS — R16.0 LIVER MASS, RIGHT LOBE: ICD-10-CM

## 2022-12-06 DIAGNOSIS — F02.80 ALZHEIMER'S DEMENTIA, UNSPECIFIED DEMENTIA SEVERITY, UNSPECIFIED TIMING OF DEMENTIA ONSET, UNSPECIFIED WHETHER BEHAVIORAL, PSYCHOTIC, OR MOOD DISTURBANCE OR ANXIETY: ICD-10-CM

## 2022-12-06 DIAGNOSIS — G30.9 ALZHEIMER'S DEMENTIA, UNSPECIFIED DEMENTIA SEVERITY, UNSPECIFIED TIMING OF DEMENTIA ONSET, UNSPECIFIED WHETHER BEHAVIORAL, PSYCHOTIC, OR MOOD DISTURBANCE OR ANXIETY: ICD-10-CM

## 2022-12-06 DIAGNOSIS — R10.9 INTERMITTENT ABDOMINAL PAIN: ICD-10-CM

## 2022-12-06 DIAGNOSIS — K80.20 GALLSTONES: ICD-10-CM

## 2022-12-06 DIAGNOSIS — K59.09 CHRONIC CONSTIPATION: Primary | ICD-10-CM

## 2022-12-06 DIAGNOSIS — K62.89 RECTAL PAIN: ICD-10-CM

## 2022-12-06 PROCEDURE — 99213 PR OFFICE/OUTPT VISIT, EST, LEVL III, 20-29 MIN: ICD-10-PCS | Mod: S$PBB,,,

## 2022-12-06 PROCEDURE — 99999 PR PBB SHADOW E&M-EST. PATIENT-LVL V: ICD-10-PCS | Mod: PBBFAC,,,

## 2022-12-06 PROCEDURE — 99213 OFFICE O/P EST LOW 20 MIN: CPT | Mod: S$PBB,,,

## 2022-12-06 PROCEDURE — 99215 OFFICE O/P EST HI 40 MIN: CPT | Mod: PBBFAC,PN

## 2022-12-06 PROCEDURE — 99999 PR PBB SHADOW E&M-EST. PATIENT-LVL V: CPT | Mod: PBBFAC,,,

## 2022-12-06 NOTE — PROGRESS NOTES
"Subjective:       Patient ID: Tasha Benson is a 88 y.o. female Body mass index is 21.91 kg/m².    Chief Complaint: Constipation    This patient is new to me.  Established patient of Dr. Childs (retired).     Patient's daughter and  present and assisting with history. Patient difficult historian due to Alzheimer's dementia.    GI Problem  The primary symptoms include abdominal pain. Primary symptoms do not include fever, weight loss, fatigue, nausea, vomiting, diarrhea, melena, hematemesis, jaundice, hematochezia or dysuria.   The abdominal pain began more than 2 days ago (chronic; intermittent episodes when she bangs her belly). The abdominal pain has been unchanged since its onset. The abdominal pain is located in the LLQ, RLQ and suprapubic region. The abdominal pain does not radiate. The severity of the abdominal pain is 0/10 (denies pain currently; reports pain after eating; described as an ache). The abdominal pain is relieved by bowel movements (currently taking gas-x takes 2 pills PRN).   The illness is also significant for constipation (chronic; unsure of number of BMs; patient reports she has daily BMs; takes miralax daily; daughter states she uses ~4-5 rolls of toilet paper daily & spends lots of time in bathroom). The illness does not include chills, anorexia, dysphagia, odynophagia or bloating. Associated symptoms comments: MRI MRCP 11/03/22 - "Cholelithiasis without MR evidence of cholecystitis.  Intrahepatic biliary dilatation or dilation of the common bile duct is not seen. 3 cm slightly lobular mass of the central right liver.  This could represent a cavernous hemangioma but this is not proven. Recommend correlation with the liver ultrasound or CT of the liver with contrast and delayed imaging or MR of the liver with contrast and delayed imaging". Significant associated medical issues include gallstones and hemorrhoids. Associated medical issues do not include inflammatory bowel disease, " GERD, liver disease, alcohol abuse, PUD, gastric bypass or irritable bowel syndrome. Associated medical issues comments: hx of abdominal/intestinal surgery 60+ years ago, but unsure exactly what it was associated with.     Review of Systems   Constitutional:  Negative for activity change, appetite change, chills, diaphoresis, fatigue, fever, unexpected weight change and weight loss.   HENT:  Negative for sore throat and trouble swallowing.    Respiratory:  Positive for cough (dry cough). Negative for choking and shortness of breath.    Cardiovascular:  Negative for chest pain.   Gastrointestinal:  Positive for abdominal pain, constipation (chronic; unsure of number of BMs; patient reports she has daily BMs; takes miralax daily; daughter states she uses ~4-5 rolls of toilet paper daily & spends lots of time in bathroom) and rectal pain (patient's daught reports patient complains her butt is on fire after BM). Negative for abdominal distention, anal bleeding, anorexia, bloating, blood in stool, diarrhea, dysphagia, hematemesis, hematochezia, jaundice, melena, nausea and vomiting.   Genitourinary:  Negative for dysuria.       No LMP recorded. Patient is postmenopausal.  Past Medical History:   Diagnosis Date    Seizures     Unspecified dementia without behavioral disturbance      Past Surgical History:   Procedure Laterality Date    ABDOMINAL HERNIA REPAIR      APPENDECTOMY      HERNIA REPAIR       Family History   Problem Relation Age of Onset    Colon cancer Neg Hx     Colon polyps Neg Hx      Social History     Tobacco Use    Smoking status: Former    Smokeless tobacco: Never   Substance Use Topics    Alcohol use: Yes     Alcohol/week: 4.0 standard drinks     Types: 4 Glasses of wine per week    Drug use: No     Wt Readings from Last 10 Encounters:   12/06/22 57.9 kg (127 lb 10.3 oz)   11/17/22 56.7 kg (125 lb)   09/22/22 56.4 kg (124 lb 4.8 oz)   09/20/22 56.2 kg (124 lb)   11/30/21 56.7 kg (125 lb)   04/16/18 65.8  kg (145 lb)   12/22/15 58 kg (127 lb 13.9 oz)   07/23/12 53.1 kg (117 lb)   06/25/12 53.3 kg (117 lb 8 oz)   05/21/12 51.8 kg (114 lb 1.6 oz)     Lab Results   Component Value Date    WBC 7.44 09/28/2022    HGB 13.2 09/28/2022    HCT 39.7 09/28/2022    MCV 89 09/28/2022     09/28/2022     CMP  Sodium   Date Value Ref Range Status   09/28/2022 141 136 - 145 mmol/L Final     Potassium   Date Value Ref Range Status   09/28/2022 4.6 3.5 - 5.1 mmol/L Final     Chloride   Date Value Ref Range Status   09/28/2022 104 95 - 110 mmol/L Final     CO2   Date Value Ref Range Status   09/28/2022 27 23 - 29 mmol/L Final     Glucose   Date Value Ref Range Status   09/28/2022 83 70 - 110 mg/dL Final     BUN   Date Value Ref Range Status   09/28/2022 13 8 - 23 mg/dL Final     Creatinine   Date Value Ref Range Status   09/28/2022 0.7 0.5 - 1.4 mg/dL Final     Calcium   Date Value Ref Range Status   09/28/2022 9.2 8.7 - 10.5 mg/dL Final     Total Protein   Date Value Ref Range Status   09/28/2022 6.7 6.0 - 8.4 g/dL Final     Albumin   Date Value Ref Range Status   09/28/2022 4.0 3.5 - 5.2 g/dL Final     Total Bilirubin   Date Value Ref Range Status   09/28/2022 0.4 0.1 - 1.0 mg/dL Final     Comment:     For infants and newborns, interpretation of results should be based  on gestational age, weight and in agreement with clinical  observations.    Premature Infant recommended reference ranges:  Up to 24 hours.............<8.0 mg/dL  Up to 48 hours............<12.0 mg/dL  3-5 days..................<15.0 mg/dL  6-29 days.................<15.0 mg/dL       Alkaline Phosphatase   Date Value Ref Range Status   09/28/2022 75 55 - 135 U/L Final     AST   Date Value Ref Range Status   09/28/2022 19 10 - 40 U/L Final     ALT   Date Value Ref Range Status   09/28/2022 16 10 - 44 U/L Final     Anion Gap   Date Value Ref Range Status   09/28/2022 10 8 - 16 mmol/L Final     eGFR if    Date Value Ref Range Status   04/16/2018  >60.0 >60 mL/min/1.73 m^2 Final     eGFR if non    Date Value Ref Range Status   04/16/2018 >60.0 >60 mL/min/1.73 m^2 Final     Comment:     Calculation used to obtain the estimated glomerular filtration  rate (eGFR) is the CKD-EPI equation.        Reviewed prior medical records including radiology report of CT enterography abdomen 09/28/22, MRI MRCP 11/03/22 & endoscopy history (see surgical history).    Objective:      Physical Exam  Vitals and nursing note reviewed.   Constitutional:       General: She is not in acute distress.     Appearance: Normal appearance. She is normal weight. She is not ill-appearing.   HENT:      Mouth/Throat:      Lips: Pink. No lesions.   Cardiovascular:      Rate and Rhythm: Normal rate and regular rhythm.      Pulses: Normal pulses.   Pulmonary:      Effort: Pulmonary effort is normal. No respiratory distress.      Breath sounds: Normal breath sounds.   Abdominal:      General: Abdomen is flat. Bowel sounds are normal. There is no distension or abdominal bruit. There are no signs of injury.      Palpations: Abdomen is soft. There is no shifting dullness, fluid wave, hepatomegaly, splenomegaly or mass.      Tenderness: There is no abdominal tenderness. There is no guarding or rebound. Negative signs include Yoder's sign, Rovsing's sign and McBurney's sign.      Hernia: No hernia is present.   Skin:     General: Skin is warm and dry.      Coloration: Skin is not jaundiced or pale.   Neurological:      Mental Status: She is alert and oriented to person, place, and time.   Psychiatric:         Attention and Perception: Attention normal.         Mood and Affect: Mood normal.         Speech: Speech normal.         Behavior: Behavior normal.       Assessment:       1. Chronic constipation    2. Intermittent abdominal pain    3. Rectal pain    4. Gallstones    5. Liver mass, right lobe    6. Alzheimer's dementia, unspecified dementia severity, unspecified timing of dementia  onset, unspecified whether behavioral, psychotic, or mood disturbance or anxiety        Plan:       Chronic constipation  Recommend daily exercise as tolerated, adequate water intake (six 8-oz glasses of water daily), and high fiber diet. OTC fiber supplements are recommended if diet does not reach daily fiber goal (20-30 grams daily), such as Metamucil, Citrucel, or FiberCon (take as directed, separate from other oral medications by >2 hours).  -Continue miralax daily  -pending x-ray consider linzess 72 mcg daily  -     X-Ray Abdomen AP 1 View; Future; Expected date: 12/06/2022  -     TSH; Future; Expected date: 12/06/2022  - discussed about Colonoscopy, including the risks and benefits of colonoscopy, patient/patient's family verbalized understanding but patient declined colonoscopy at this time & would like to complete recommended testing prior to colonoscopy.    Intermittent abdominal pain  -     X-Ray Abdomen AP 1 View; Future; Expected date: 12/06/2022    Rectal pain  - avoid constipation and straining with bowel movements; try using an OTC stool softener as directed and increase fiber in diet (20-30 grams daily)/OTC fiber supplement such as metamucil (take as directed)  - recommend SITZ baths  - possible referral to colorectal surgery if symptoms persist    Gallstones  - possible HIDA scan  - recommend low fat diet  - discussed diagnosis & that it can cause symptoms in some patients, while other patients with it can be asymptomatic; recommend continue recommendations as discussed during our visit and if symptoms persist after other testing has been completed, recommend seeing general surgery for further evaluation and management, patient verbalized understanding    Liver mass, right lobe  -     Ambulatory referral/consult to Hepatology; Future; Expected date: 12/13/2022    Alzheimer's dementia, unspecified dementia severity, unspecified timing of dementia onset, unspecified whether behavioral, psychotic, or  mood disturbance or anxiety  -Follow-up with PCP and/or neurology for continued evaluation and management     Follow up in about 4 weeks (around 1/3/2023), or if symptoms worsen or fail to improve.      If no improvement in symptoms or symptoms worsen, call/follow-up at clinic or go to ER.        30 minutes of total time spent on the encounter, which includes face to face time and non-face to face time preparing to see the patient (eg, review of tests), Obtaining and/or reviewing separately obtained history, Documenting clinical information in the electronic or other health record, Independently interpreting results (not separately reported) and communicating results to the patient/family/caregiver, or Care coordination (not separately reported).

## 2022-12-07 ENCOUNTER — HOSPITAL ENCOUNTER (OUTPATIENT)
Dept: RADIOLOGY | Facility: HOSPITAL | Age: 87
Discharge: HOME | End: 2022-12-07
Payer: MEDICARE

## 2022-12-07 DIAGNOSIS — R10.9 INTERMITTENT ABDOMINAL PAIN: ICD-10-CM

## 2022-12-07 DIAGNOSIS — K59.09 CHRONIC CONSTIPATION: ICD-10-CM

## 2022-12-07 PROCEDURE — 74018 RADEX ABDOMEN 1 VIEW: CPT | Mod: 26,,, | Performed by: RADIOLOGY

## 2022-12-07 PROCEDURE — 74018 RADEX ABDOMEN 1 VIEW: CPT | Mod: TC,PN

## 2022-12-07 PROCEDURE — 74018 XR ABDOMEN AP 1 VIEW: ICD-10-PCS | Mod: 26,,, | Performed by: RADIOLOGY

## 2022-12-08 ENCOUNTER — TELEPHONE (OUTPATIENT)
Dept: GASTROENTEROLOGY | Facility: CLINIC | Age: 87
End: 2022-12-08
Payer: MEDICARE

## 2022-12-08 ENCOUNTER — TELEPHONE (OUTPATIENT)
Dept: HEPATOLOGY | Facility: CLINIC | Age: 87
End: 2022-12-08
Payer: MEDICARE

## 2022-12-08 DIAGNOSIS — K59.09 CHRONIC CONSTIPATION: Primary | ICD-10-CM

## 2022-12-08 DIAGNOSIS — K76.9 LIVER DISEASE, UNSPECIFIED: ICD-10-CM

## 2022-12-08 DIAGNOSIS — R16.0 LIVER MASS, RIGHT LOBE: Primary | ICD-10-CM

## 2022-12-08 NOTE — TELEPHONE ENCOUNTER
Work queue triaged - referred for liver lesion, indeterminaete, 87yo woman from [place].  Needs physician consult but first available Dami Webber 3/9/22.  Dr. Cárdenas has openings next week. Communicated to referring provider.     Spoke with patient's  and daughter.   They don't want to travel to [place] or [place]  Will ask GI or PCP for imaging orders.  Out of caution scheduled appt 3/14/22 with Dr Brower but imaging prior would be preferred

## 2022-12-08 NOTE — TELEPHONE ENCOUNTER
Talked to patients son, let know that MRI orders are in and they will be calling to schedule. Son denies any metal in body, no further issues noted.

## 2023-01-03 ENCOUNTER — HOSPITAL ENCOUNTER (OUTPATIENT)
Dept: RADIOLOGY | Facility: HOSPITAL | Age: 88
Discharge: HOME | End: 2023-01-03
Payer: MEDICARE

## 2023-01-03 DIAGNOSIS — K76.9 LIVER DISEASE, UNSPECIFIED: ICD-10-CM

## 2023-01-03 PROCEDURE — 74183 MRI ABD W/O CNTR FLWD CNTR: CPT | Mod: 26,,, | Performed by: RADIOLOGY

## 2023-01-03 PROCEDURE — 74183 MRI ABD W/O CNTR FLWD CNTR: CPT | Mod: TC

## 2023-01-03 PROCEDURE — 25500020 PHARM REV CODE 255

## 2023-01-03 PROCEDURE — 74183 MRI ABDOMEN W WO CONTRAST: ICD-10-PCS | Mod: 26,,, | Performed by: RADIOLOGY

## 2023-01-03 PROCEDURE — A9585 GADOBUTROL INJECTION: HCPCS

## 2023-01-03 RX ORDER — GADOBUTROL 604.72 MG/ML
6 INJECTION INTRAVENOUS
Status: COMPLETED | OUTPATIENT
Start: 2023-01-03 | End: 2023-01-03

## 2023-01-03 RX ADMIN — GADOBUTROL 6 ML: 604.72 INJECTION INTRAVENOUS at 03:01

## 2023-01-24 ENCOUNTER — OFFICE VISIT (OUTPATIENT)
Dept: GASTROENTEROLOGY | Facility: CLINIC | Age: 88
End: 2023-01-24
Payer: MEDICARE

## 2023-01-24 VITALS
WEIGHT: 125.44 LBS | HEART RATE: 80 BPM | DIASTOLIC BLOOD PRESSURE: 60 MMHG | BODY MASS INDEX: 21.53 KG/M2 | SYSTOLIC BLOOD PRESSURE: 125 MMHG

## 2023-01-24 DIAGNOSIS — K59.00 CONSTIPATION, UNSPECIFIED CONSTIPATION TYPE: Primary | ICD-10-CM

## 2023-01-24 DIAGNOSIS — R93.3 ABNORMAL FINDING ON GI TRACT IMAGING: ICD-10-CM

## 2023-01-24 DIAGNOSIS — F03.90 DEMENTIA, UNSPECIFIED DEMENTIA SEVERITY, UNSPECIFIED DEMENTIA TYPE, UNSPECIFIED WHETHER BEHAVIORAL, PSYCHOTIC, OR MOOD DISTURBANCE OR ANXIETY: ICD-10-CM

## 2023-01-24 PROCEDURE — 99999 PR PBB SHADOW E&M-EST. PATIENT-LVL III: ICD-10-PCS | Mod: PBBFAC,,, | Performed by: INTERNAL MEDICINE

## 2023-01-24 PROCEDURE — 99213 OFFICE O/P EST LOW 20 MIN: CPT | Mod: PBBFAC,PN | Performed by: INTERNAL MEDICINE

## 2023-01-24 PROCEDURE — 99214 PR OFFICE/OUTPT VISIT, EST, LEVL IV, 30-39 MIN: ICD-10-PCS | Mod: S$PBB,,, | Performed by: INTERNAL MEDICINE

## 2023-01-24 PROCEDURE — 99214 OFFICE O/P EST MOD 30 MIN: CPT | Mod: S$PBB,,, | Performed by: INTERNAL MEDICINE

## 2023-01-24 PROCEDURE — 99999 PR PBB SHADOW E&M-EST. PATIENT-LVL III: CPT | Mod: PBBFAC,,, | Performed by: INTERNAL MEDICINE

## 2023-01-24 RX ORDER — LINACLOTIDE 72 UG/1
72 CAPSULE, GELATIN COATED ORAL
COMMUNITY
Start: 2023-01-19 | End: 2023-02-02 | Stop reason: ALTCHOICE

## 2023-01-24 RX ORDER — AMOXICILLIN 500 MG/1
500 CAPSULE ORAL EVERY 12 HOURS
COMMUNITY
Start: 2023-01-09 | End: 2023-05-10 | Stop reason: ALTCHOICE

## 2023-01-24 RX ORDER — CLOTRIMAZOLE 1 %
1 CREAM (GRAM) TOPICAL 2 TIMES DAILY
COMMUNITY
Start: 2023-01-19

## 2023-01-24 RX ORDER — PERMETHRIN 50 MG/G
CREAM TOPICAL
COMMUNITY
Start: 2023-01-05 | End: 2023-05-10

## 2023-01-25 NOTE — PROGRESS NOTES
Subjective:       Patient ID: Tasha Benson is a 88 y.o. female.    This is an established patient.      Chief Complaint: Constipation      Patient seen for constipation, onset remote, with stool frequency variable, moderate to severe in severity, with stool form being variable at different times.  Associated signs and symptoms include abdominal tenderness and alleviating/exacerbating factors include none.   Last colonoscopy was many years ago.  History given by patient's family at interview secondary to dementia.  She has recent imaging with distal stool impaction noted.  She was on Linzess 72 mcg with some improvement but still without complete evacuation.  Family notes that this medication was stopped and her time in the bathroom seemed to increase and symptoms seemed to worsen again.  Drug has been resumed with mild response but overall they feel that she still has room for improvement.  She appears comfortable today.  No other acute GI complaints.           Review of Systems   Constitutional:  Negative for chills, fatigue and fever.   HENT:  Negative for sore throat and trouble swallowing.    Respiratory:  Negative for cough, shortness of breath and wheezing.    Cardiovascular:  Negative for chest pain and palpitations.   Gastrointestinal:  Positive for change in bowel habit, constipation and change in bowel habit. Negative for abdominal pain, blood in stool, nausea and vomiting.   Genitourinary:  Negative for dysuria and hematuria.   Musculoskeletal:  Negative for arthralgias and myalgias.   Integumentary:  Negative for color change and rash.   Neurological:  Negative for dizziness and headaches.   Hematological:  Negative for adenopathy.   Psychiatric/Behavioral:  Negative for confusion. The patient is not nervous/anxious.    All other systems reviewed and are negative.      Objective:      Vitals:    01/24/23 1444   BP: 125/60   BP Location: Left arm   Patient Position: Sitting   Pulse: 80   Weight: 56.9 kg  (125 lb 7.1 oz)       Physical Exam  Constitutional:       Appearance: She is well-developed.   HENT:      Head: Normocephalic and atraumatic.   Eyes:      General: No scleral icterus.     Pupils: Pupils are equal, round, and reactive to light.   Cardiovascular:      Rate and Rhythm: Normal rate and regular rhythm.      Heart sounds: No murmur heard.  Pulmonary:      Effort: Pulmonary effort is normal.      Breath sounds: Normal breath sounds. No wheezing.   Abdominal:      General: Bowel sounds are normal. There is no distension.      Palpations: Abdomen is soft.      Tenderness: There is abdominal tenderness (mild, lower abdomen).   Musculoskeletal:         General: No tenderness.      Cervical back: Normal range of motion.   Lymphadenopathy:      Cervical: No cervical adenopathy.   Skin:     General: Skin is warm and dry.      Findings: No rash.   Neurological:      Mental Status: She is alert.         Lab Results   Component Value Date    WBC 7.44 09/28/2022    HGB 13.2 09/28/2022    HCT 39.7 09/28/2022    MCV 89 09/28/2022     09/28/2022         Imaging was independently visualized and reviewed by me and showed stool impaction.    Assessment:       Problem List Items Addressed This Visit       Constipation - Primary     Other Visit Diagnoses       Dementia, unspecified dementia severity, unspecified dementia type, unspecified whether behavioral, psychotic, or mood disturbance or anxiety        Abnormal finding on GI tract imaging                  Plan:       Continue Linzess.  Increase to 145 mcg  Recommend daily exercise, adequate water intake, and high fiber diet.  Recommend daily miralax (17g PO once or twice daily) with intermittently dosed dulcolax (every 2-3 days)  to facilitate bowel movements.  If no relief with this, consider adding emollient laxative (castor oil or mineral oil) +/- enema.  Follow up in 6-8 weeks to review.    Further recommendations to follow after above.

## 2023-01-31 ENCOUNTER — PATIENT MESSAGE (OUTPATIENT)
Dept: GASTROENTEROLOGY | Facility: CLINIC | Age: 88
End: 2023-01-31
Payer: MEDICARE

## 2023-02-28 ENCOUNTER — TELEPHONE (OUTPATIENT)
Dept: HEPATOLOGY | Facility: CLINIC | Age: 88
End: 2023-02-28
Payer: MEDICARE

## 2023-02-28 NOTE — TELEPHONE ENCOUNTER
Called patient to reschedule appointment from 3/14/23 to 3/13/23 left voicemail to return call at [phone]

## 2023-03-01 ENCOUNTER — TELEPHONE (OUTPATIENT)
Dept: HEPATOLOGY | Facility: CLINIC | Age: 88
End: 2023-03-01
Payer: MEDICARE

## 2023-03-01 NOTE — TELEPHONE ENCOUNTER
Referral to hepatology for hemangioma    Scheduling attempt # 2 (cancelled MD appt for March)    Please call pt to schedule appt with A Scrogcosta or any MD    Encourage video visits for new patient appts with APPs if she can drive to [place]

## 2023-03-02 ENCOUNTER — OFFICE VISIT (OUTPATIENT)
Dept: UROLOGY | Facility: CLINIC | Age: 88
End: 2023-03-02
Payer: MEDICARE

## 2023-03-02 VITALS
SYSTOLIC BLOOD PRESSURE: 121 MMHG | BODY MASS INDEX: 21.53 KG/M2 | DIASTOLIC BLOOD PRESSURE: 73 MMHG | RESPIRATION RATE: 18 BRPM | HEIGHT: 64 IN | HEART RATE: 80 BPM

## 2023-03-02 DIAGNOSIS — R39.15 URGENCY OF URINATION: Primary | ICD-10-CM

## 2023-03-02 LAB — POC RESIDUAL URINE VOLUME: 17 ML (ref 0–100)

## 2023-03-02 PROCEDURE — 99213 OFFICE O/P EST LOW 20 MIN: CPT | Mod: PBBFAC,PN | Performed by: NURSE PRACTITIONER

## 2023-03-02 PROCEDURE — 51798 US URINE CAPACITY MEASURE: CPT | Mod: PBBFAC,PN | Performed by: NURSE PRACTITIONER

## 2023-03-02 PROCEDURE — 99214 PR OFFICE/OUTPT VISIT, EST, LEVL IV, 30-39 MIN: ICD-10-PCS | Mod: S$PBB,,, | Performed by: NURSE PRACTITIONER

## 2023-03-02 PROCEDURE — 99214 OFFICE O/P EST MOD 30 MIN: CPT | Mod: S$PBB,,, | Performed by: NURSE PRACTITIONER

## 2023-03-02 PROCEDURE — 99999 PR PBB SHADOW E&M-EST. PATIENT-LVL III: ICD-10-PCS | Mod: PBBFAC,,, | Performed by: NURSE PRACTITIONER

## 2023-03-02 PROCEDURE — 99999 PR PBB SHADOW E&M-EST. PATIENT-LVL III: CPT | Mod: PBBFAC,,, | Performed by: NURSE PRACTITIONER

## 2023-03-02 NOTE — PROGRESS NOTES
CHIEF COMPLAINT:    Mrs Benson is a 89 y.o. female presenting for f/u urgency.  PRESENTING ILLNESS:    Tasha Benson is a 89 y.o. female who presents for urgency. Last clinic visit was 11/17/22 with Dr. Galdamez.    Initial consult by Dr. Galdamez in clinic on 11/17/22 for mild hydro seen on ct:     She was referred to me because she was found to have mild bilateral hydroureteronephrosis her CT enterography done 09/28/2022 for persistent abdominal pain ordered by her gastroenterologist who has since retired.  Review of the CT shows mildly distended bladder with mild hydro.  CT head also showed a liver mass that needed further evaluation.  And a left upper pole cyst.  She also had significant rectal distention.     Her gastroenterologist then ordered an MRI MRCP which confirmed that the liver mass was hemangioma.  It also showed that the hydronephrosis had resolved.  However it did not show the rectum.     Per family she was then started on daily constipation regimen with MiraLax.  She is had a bowel movement daily.  However she has significant urge to urinate or have a bowel movement every hour to 2 hours.  Since then she is continued to have abdominal pain daily.  She spends over 6 hours in the bathroom and uses over bowls toilet paper.  She also has some dementia.  No pads.  No urine or stool incontinence.     UA today negative.  PVR by scan 32.     Ct enterography 9/28/22                   Mri mrcp 11/3/22: no hydro seen.                 The most bothersome issue for the patient today is feeling urge to urinate or have bm frequently.      Urine history:  11/17/22          Neg, pvr by scan: 32     3/2/23  Patient presents to clinic today with her  and daughter for f/u urgency. Patient's daughter reports patient has a constant urge to void but only voids small amounts or not at all. She tried Myrbetriq for 1 month with no improvement and also stopped because was expensive. She is unable to take  anticholinergics d/t dementia and constipation issues. She was seen by GI and started on Linzess for constipation but her family is unsure how her BM are because she does not allow them to see. She spends a lot of time in the bathroom because of constant urge to void and have BM. Her daughter pushes for increased water intake but she does not always drink much water. Denies any complaints of dysuria, gross hematuria, flank pain, fever, chills, nausea or vomiting. No UTI's. No incontinence. She is scheduled to see GI at the end of March for follow up.    PVR 17 ml    REVIEW OF SYSTEMS:    Review of Systems    Constitutional: Negative for fever and chills.   HENT: Negative for hearing loss.   Eyes: Negative for visual disturbance.   Respiratory: Negative for shortness of breath.   Cardiovascular: Negative for chest pain.   Gastrointestinal: + constipation. Negative for nausea, vomiting   Genitourinary:  See above  Hematological: Does not bruise/bleed easily.        PATIENT HISTORY:    Past Medical History:   Diagnosis Date    Seizures     Unspecified dementia without behavioral disturbance        Past Surgical History:   Procedure Laterality Date    ABDOMINAL HERNIA REPAIR      APPENDECTOMY      HERNIA REPAIR         Family History   Problem Relation Age of Onset    Colon cancer Neg Hx     Colon polyps Neg Hx        Social History     Socioeconomic History    Marital status:    Tobacco Use    Smoking status: Former    Smokeless tobacco: Never   Substance and Sexual Activity    Alcohol use: Yes     Alcohol/week: 4.0 standard drinks     Types: 4 Glasses of wine per week    Drug use: No    Sexual activity: Not Currently       Allergies:  Dilantin [phenytoin sodium extended]    Medications:    Current Outpatient Medications:     amoxicillin (AMOXIL) 500 MG capsule, Take 500 mg by mouth every 12 (twelve) hours., Disp: , Rfl:     calcium-vitamin D 250-100 mg-unit per tablet, Take 1 tablet by mouth daily as needed.,  Disp: , Rfl:     clotrimazole (LOTRIMIN) 1 % cream, 1 application 2 (two) times daily. Apply to affected area, Disp: , Rfl:     docusate sodium (COLACE) 100 MG capsule, Take 100 mg by mouth 2 (two) times daily., Disp: , Rfl:     donepeziL (ARICEPT) 10 MG tablet, 10 mg., Disp: , Rfl:     fexofenadine (ALLEGRA) 60 MG tablet, Take 60 mg by mouth once daily., Disp: , Rfl:     Lactobacillus acidophilus (ACIDOPHILUS ORAL), Take by mouth., Disp: , Rfl:     linaCLOtide (LINZESS) 145 mcg Cap capsule, Take 1 capsule (145 mcg total) by mouth once daily., Disp: 90 capsule, Rfl: 3    mirabegron (MYRBETRIQ) 50 mg Tb24, Take 1 tablet (50 mg total) by mouth every morning. Take one every morning for overactive bladder, Disp: 90 tablet, Rfl: 3    multivitamin (THERAGRAN) per tablet, Take 1 tablet by mouth once daily., Disp: , Rfl:     niacin, inositol niacinate, 500 mg Cap, Take 1 capsule by mouth once daily., Disp: , Rfl:     permethrin (ELIMITE) 5 % cream, Apply topically., Disp: , Rfl:     phenobarbital (LUMINAL) 30 MG tablet, Take 120 mg by mouth every evening.  , Disp: , Rfl:     polyethylene glycol (GLYCOLAX) 17 gram PwPk, Take by mouth., Disp: , Rfl:     urea (CARMOL) 40 % Crea, Apply topically once daily., Disp: 185 g, Rfl: 11    PHYSICAL EXAMINATION:    Constitutional: She appears well-developed and well-nourished.  She is in no apparent distress.    Neck: Normal ROM.     Cardiovascular: Normal rate.      Pulmonary/Chest: Effort normal. No respiratory distress.     Abdominal:  She exhibits no distension.  There is no CVA tenderness.     Neurological: She is alert.     Skin: Skin is warm and dry.     Psych: Cooperative with normal affect.      Physical Exam      LABS:        Lab Results   Component Value Date    CREATININE 0.7 01/03/2023       IMPRESSION:    Encounter Diagnoses   Name Primary?    Urgency of urination Yes       PLAN:  -Discussed bowel/bladder dysfunction. If she continues to have issues with constipation, it  could be putting pressure on bladder giving her the sensation of needing to void. Patient's family will send message via portal after GI appt with update on constipation. Will continue to work on correcting constipation issues.  Patient's family is not interested in PTNS.   -Once constipation has improved, can try to restart myrbetriq and see if any improvement  -RTC 3 months      I encouraged her or any of her family members to call or email me with questions and/or concerns.      30 minutes of total time spent on the encounter, which includes face to face time and non-face to face time preparing to see the patient (eg, review of tests), Obtaining and/or reviewing separately obtained history, Documenting clinical information in the electronic or other health record, Independently interpreting results (not separately reported) and communicating results to the patient/family/caregiver, or Care coordination (not separately reported).

## 2023-03-28 ENCOUNTER — OFFICE VISIT (OUTPATIENT)
Dept: GASTROENTEROLOGY | Facility: CLINIC | Age: 88
End: 2023-03-28
Payer: MEDICARE

## 2023-03-28 VITALS
BODY MASS INDEX: 21.3 KG/M2 | WEIGHT: 124.13 LBS | SYSTOLIC BLOOD PRESSURE: 126 MMHG | HEART RATE: 80 BPM | DIASTOLIC BLOOD PRESSURE: 58 MMHG

## 2023-03-28 DIAGNOSIS — K59.09 CHRONIC CONSTIPATION: Primary | ICD-10-CM

## 2023-03-28 DIAGNOSIS — F03.90 DEMENTIA, UNSPECIFIED DEMENTIA SEVERITY, UNSPECIFIED DEMENTIA TYPE, UNSPECIFIED WHETHER BEHAVIORAL, PSYCHOTIC, OR MOOD DISTURBANCE OR ANXIETY: ICD-10-CM

## 2023-03-28 PROCEDURE — 99999 PR PBB SHADOW E&M-EST. PATIENT-LVL III: ICD-10-PCS | Mod: PBBFAC,,, | Performed by: INTERNAL MEDICINE

## 2023-03-28 PROCEDURE — 99213 OFFICE O/P EST LOW 20 MIN: CPT | Mod: PBBFAC,PN | Performed by: INTERNAL MEDICINE

## 2023-03-28 PROCEDURE — 99214 PR OFFICE/OUTPT VISIT, EST, LEVL IV, 30-39 MIN: ICD-10-PCS | Mod: S$PBB,,, | Performed by: INTERNAL MEDICINE

## 2023-03-28 PROCEDURE — 99214 OFFICE O/P EST MOD 30 MIN: CPT | Mod: S$PBB,,, | Performed by: INTERNAL MEDICINE

## 2023-03-28 PROCEDURE — 99999 PR PBB SHADOW E&M-EST. PATIENT-LVL III: CPT | Mod: PBBFAC,,, | Performed by: INTERNAL MEDICINE

## 2023-03-31 ENCOUNTER — TELEPHONE (OUTPATIENT)
Dept: HEPATOLOGY | Facility: CLINIC | Age: 88
End: 2023-03-31
Payer: MEDICARE

## 2023-03-31 NOTE — TELEPHONE ENCOUNTER
Referral to hepatology for possible hemangioma. Called pt to schedule appt with A Scroggs    Scheduling attempt # 3    Pt scheduled, confirmed appt -  will drive with pt to [place] for virtual visit

## 2023-04-04 ENCOUNTER — HOSPITAL ENCOUNTER (OUTPATIENT)
Dept: RADIOLOGY | Facility: HOSPITAL | Age: 88
Discharge: HOME | End: 2023-04-04
Attending: INTERNAL MEDICINE
Payer: MEDICARE

## 2023-04-04 DIAGNOSIS — K59.09 CHRONIC CONSTIPATION: ICD-10-CM

## 2023-04-04 DIAGNOSIS — F03.90 DEMENTIA, UNSPECIFIED DEMENTIA SEVERITY, UNSPECIFIED DEMENTIA TYPE, UNSPECIFIED WHETHER BEHAVIORAL, PSYCHOTIC, OR MOOD DISTURBANCE OR ANXIETY: ICD-10-CM

## 2023-04-04 PROCEDURE — 74018 RADEX ABDOMEN 1 VIEW: CPT | Mod: TC,PN

## 2023-04-04 PROCEDURE — 74018 RADEX ABDOMEN 1 VIEW: CPT | Mod: 26,,, | Performed by: RADIOLOGY

## 2023-04-04 PROCEDURE — 74018 XR ABDOMEN AP 1 VIEW: ICD-10-PCS | Mod: 26,,, | Performed by: RADIOLOGY

## 2023-04-06 NOTE — PROGRESS NOTES
Please advise patient/family that x ray showed no evidence of obstruction, only mild constipation/bowel gas.  Continue current bowel regimen.

## 2023-05-09 ENCOUNTER — TELEPHONE (OUTPATIENT)
Dept: HEPATOLOGY | Facility: CLINIC | Age: 88
End: 2023-05-09
Payer: MEDICARE

## 2023-05-09 ENCOUNTER — OFFICE VISIT (OUTPATIENT)
Dept: PODIATRY | Facility: CLINIC | Age: 88
End: 2023-05-09
Payer: MEDICARE

## 2023-05-09 VITALS
SYSTOLIC BLOOD PRESSURE: 107 MMHG | HEART RATE: 74 BPM | DIASTOLIC BLOOD PRESSURE: 63 MMHG | BODY MASS INDEX: 21.19 KG/M2 | WEIGHT: 124.13 LBS | HEIGHT: 64 IN

## 2023-05-09 DIAGNOSIS — I73.9 PERIPHERAL VASCULAR DISEASE: ICD-10-CM

## 2023-05-09 DIAGNOSIS — L60.0 INGROWN NAIL: Primary | ICD-10-CM

## 2023-05-09 PROCEDURE — 99214 OFFICE O/P EST MOD 30 MIN: CPT | Mod: PBBFAC,PN | Performed by: PODIATRIST

## 2023-05-09 PROCEDURE — 99213 OFFICE O/P EST LOW 20 MIN: CPT | Mod: S$PBB,,, | Performed by: PODIATRIST

## 2023-05-09 PROCEDURE — 99213 PR OFFICE/OUTPT VISIT, EST, LEVL III, 20-29 MIN: ICD-10-PCS | Mod: S$PBB,,, | Performed by: PODIATRIST

## 2023-05-09 PROCEDURE — 99999 PR PBB SHADOW E&M-EST. PATIENT-LVL IV: ICD-10-PCS | Mod: PBBFAC,,, | Performed by: PODIATRIST

## 2023-05-09 PROCEDURE — 99999 PR PBB SHADOW E&M-EST. PATIENT-LVL IV: CPT | Mod: PBBFAC,,, | Performed by: PODIATRIST

## 2023-05-09 RX ORDER — TRIAMCINOLONE ACETONIDE 0.25 MG/G
CREAM TOPICAL
COMMUNITY
Start: 2023-02-07

## 2023-05-09 NOTE — LETTER
May 9, 2023    Tasha Benson  [address] [place] [address]        [facility]  Clinic 1st Floor  [address]  [place] [address]  Phone: [phone] Dear Ms. Benson:    We are writing to you because we have made multiple attempts to reach you by phone and have been unsuccessful.      Your physician has referred you to Hepatology clinic for evaluation and we would like to schedule your appointment.     There are many options for scheduling this appointment, you can schedule via the [facility] portal or you can call us at your earliest convenience.  [facility] [phone]    Sincerely,      [facility] 1st Floor    [address] [place] [address] [phone]

## 2023-05-09 NOTE — TELEPHONE ENCOUNTER
Pt no showed recent hepatology appt, which was 3rd attempt at scheduling.   Mailed unable to contact letter

## 2023-05-10 NOTE — PROGRESS NOTES
Subjective:       Patient ID: Tasha Benson is a 89 y.o. female.    Chief Complaint: No chief complaint on file.         Patient is complaining of ingrowing toenails on both big toes they have been causing her discomfort.  Past Medical History:   Diagnosis Date    Seizures     Unspecified dementia without behavioral disturbance      Past Surgical History:   Procedure Laterality Date    ABDOMINAL HERNIA REPAIR      APPENDECTOMY      HERNIA REPAIR       Family History   Problem Relation Age of Onset    Colon cancer Neg Hx     Colon polyps Neg Hx      Social History     Socioeconomic History    Marital status:    Tobacco Use    Smoking status: Former    Smokeless tobacco: Never   Substance and Sexual Activity    Alcohol use: Yes     Alcohol/week: 4.0 standard drinks     Types: 4 Glasses of wine per week    Drug use: No    Sexual activity: Not Currently       Current Outpatient Medications   Medication Sig Dispense Refill    fexofenadine (ALLEGRA) 60 MG tablet Take 60 mg by mouth once daily.      Lactobacillus acidophilus (ACIDOPHILUS ORAL) Take by mouth.      linaCLOtide (LINZESS) 145 mcg Cap capsule Take 1 capsule (145 mcg total) by mouth once daily. 90 capsule 3    multivitamin (THERAGRAN) per tablet Take 1 tablet by mouth once daily.      phenobarbital (LUMINAL) 30 MG tablet Take 120 mg by mouth every evening.        polyethylene glycol (GLYCOLAX) 17 gram PwPk Take by mouth.      calcium-vitamin D 250-100 mg-unit per tablet Take 1 tablet by mouth daily as needed.      clotrimazole (LOTRIMIN) 1 % cream 1 application 2 (two) times daily. Apply to affected area      triamcinolone acetonide 0.025% (KENALOG) 0.025 % cream APPLY TO AFFECTED AREA TWICE A DAY FOR 14 DAYS      urea (CARMOL) 40 % Crea Apply topically once daily. (Patient not taking: Reported on 5/9/2023) 185 g 11     No current facility-administered medications for this visit.     Review of patient's allergies indicates:   Allergen Reactions     "Dilantin [phenytoin sodium extended] Rash       Review of Systems   Musculoskeletal:  Positive for arthralgias, gait problem and joint swelling.   All other systems reviewed and are negative.    Objective:      Vitals:    05/09/23 1533   BP: 107/63   Pulse: 74   Weight: 56.3 kg (124 lb 1.9 oz)   Height: 5' 4" (1.626 m)     Physical Exam  Vitals and nursing note reviewed.   Constitutional:       Appearance: Normal appearance.   Cardiovascular:      Pulses:           Dorsalis pedis pulses are 1+ on the right side and 1+ on the left side.        Posterior tibial pulses are 0 on the right side and 0 on the left side.   Pulmonary:      Effort: Pulmonary effort is normal.   Musculoskeletal:         General: Tenderness and deformity present. Normal range of motion.   Feet:      Right foot:      Protective Sensation: 2 sites tested.  2 sites sensed.      Skin integrity: Erythema, callus and dry skin present.      Toenail Condition: Right toenails are abnormally thick, long and ingrown. Fungal disease present.     Left foot:      Protective Sensation: 2 sites tested.  2 sites sensed.      Skin integrity: Erythema, callus and dry skin present.      Toenail Condition: Left toenails are abnormally thick, long and ingrown. Fungal disease present.  Skin:     Capillary Refill: Capillary refill takes more than 3 seconds.      Findings: Erythema present.   Neurological:      General: No focal deficit present.      Mental Status: She is alert.   Psychiatric:         Mood and Affect: Mood normal.         Behavior: Behavior normal.         Thought Content: Thought content normal.         Judgment: Judgment normal.                                  Assessment:       1. Ingrown nail    2. Peripheral vascular disease        Plan:          Patient is complaining of ingrowing toenails on both big toes they have been causing her discomfort.  A comprehensive new patient evaluation was performed today patient does have significant pain and " discomfort on examination of both great toes many of the patient's nails are elongated however the big toes are also ingrowing into both the medial lateral border the causing patient discomfort and at risk for infection.  Patient did not require a nail avulsion at this time I was able to aggressively trim and debride the ingrowing nails which offer the patient relief certainly would not want to undergo a nail avulsion unless absolutely necessary as the patient does have significant peripheral vascular disease which could lead to nonhealing following a nail avulsion.  Patient indicated the areas felt much better following debridement these need to be monitored closely I have recommended the application of Vicks vapor rub twice a day every day which will help combat the fungal infection ultimately the fungal infection has caused the ingrowing toenail which can lead to a bacterial infection.  Recommended follow-up will be as needed patient advised if she is not pain-free from the ingrowing toenail areas where they were trimmed and removed in 4-5 days to contact us for follow-up further evaluation and treatment.   I did again recommend the application of Vicks vapor rub again to help control the fungal involvement.  Time including discussion evaluation discussion of treatment options treatment plan new patient evaluation removal of ingrowing toenails and documentation of today's visit equaled 20 minutes.This note was created using [employer] voice recognition software that occasionally misinterpreted phrases or words.

## 2023-10-18 ENCOUNTER — TELEPHONE (OUTPATIENT)
Dept: PODIATRY | Facility: CLINIC | Age: 88
End: 2023-10-18
Payer: MEDICARE

## 2023-10-26 ENCOUNTER — OFFICE VISIT (OUTPATIENT)
Dept: PODIATRY | Facility: CLINIC | Age: 88
End: 2023-10-26
Payer: MEDICARE

## 2023-10-26 VITALS
SYSTOLIC BLOOD PRESSURE: 110 MMHG | WEIGHT: 124 LBS | DIASTOLIC BLOOD PRESSURE: 58 MMHG | BODY MASS INDEX: 21.17 KG/M2 | HEART RATE: 77 BPM | HEIGHT: 64 IN

## 2023-10-26 DIAGNOSIS — I73.9 PERIPHERAL VASCULAR DISEASE: ICD-10-CM

## 2023-10-26 DIAGNOSIS — L60.0 INGROWN NAIL: Primary | ICD-10-CM

## 2023-10-26 DIAGNOSIS — L97.511 ULCER OF BOTH FEET, LIMITED TO BREAKDOWN OF SKIN: ICD-10-CM

## 2023-10-26 DIAGNOSIS — L97.521 ULCER OF BOTH FEET, LIMITED TO BREAKDOWN OF SKIN: ICD-10-CM

## 2023-10-26 PROCEDURE — 99214 OFFICE O/P EST MOD 30 MIN: CPT | Mod: PBBFAC,PN | Performed by: PODIATRIST

## 2023-10-26 PROCEDURE — 99999 PR PBB SHADOW E&M-EST. PATIENT-LVL IV: ICD-10-PCS | Mod: PBBFAC,,, | Performed by: PODIATRIST

## 2023-10-26 PROCEDURE — 99213 PR OFFICE/OUTPT VISIT, EST, LEVL III, 20-29 MIN: ICD-10-PCS | Mod: S$PBB,,, | Performed by: PODIATRIST

## 2023-10-26 PROCEDURE — 99213 OFFICE O/P EST LOW 20 MIN: CPT | Mod: S$PBB,,, | Performed by: PODIATRIST

## 2023-10-26 PROCEDURE — 99999 PR PBB SHADOW E&M-EST. PATIENT-LVL IV: CPT | Mod: PBBFAC,,, | Performed by: PODIATRIST

## 2023-10-27 PROBLEM — L97.511 ULCER OF BOTH FEET, LIMITED TO BREAKDOWN OF SKIN: Status: ACTIVE | Noted: 2023-10-27

## 2023-10-27 PROBLEM — L97.521 ULCER OF BOTH FEET, LIMITED TO BREAKDOWN OF SKIN: Status: ACTIVE | Noted: 2023-10-27

## 2023-10-28 NOTE — PROGRESS NOTES
Subjective:       Patient ID: Tasha Benson is a 89 y.o. female.    Chief Complaint: Nail Problem         Patient is complaining of ingrowing toenails on both big toes they have been causing her discomfort.  Past Medical History:   Diagnosis Date    Seizures     Unspecified dementia without behavioral disturbance      Past Surgical History:   Procedure Laterality Date    ABDOMINAL HERNIA REPAIR      APPENDECTOMY      HERNIA REPAIR       Family History   Problem Relation Age of Onset    Colon cancer Neg Hx     Colon polyps Neg Hx      Social History     Socioeconomic History    Marital status:    Tobacco Use    Smoking status: Former    Smokeless tobacco: Never   Substance and Sexual Activity    Alcohol use: Yes     Alcohol/week: 4.0 standard drinks of alcohol     Types: 4 Glasses of wine per week    Drug use: No    Sexual activity: Not Currently       Current Outpatient Medications   Medication Sig Dispense Refill    calcium-vitamin D 250-100 mg-unit per tablet Take 1 tablet by mouth daily as needed.      clotrimazole (LOTRIMIN) 1 % cream 1 application 2 (two) times daily. Apply to affected area      fexofenadine (ALLEGRA) 60 MG tablet Take 60 mg by mouth once daily.      Lactobacillus acidophilus (ACIDOPHILUS ORAL) Take by mouth.      linaCLOtide (LINZESS) 145 mcg Cap capsule Take 1 capsule (145 mcg total) by mouth once daily. 90 capsule 3    multivitamin (THERAGRAN) per tablet Take 1 tablet by mouth once daily.      phenobarbital (LUMINAL) 30 MG tablet Take 120 mg by mouth every evening.        polyethylene glycol (GLYCOLAX) 17 gram PwPk Take by mouth.      triamcinolone acetonide 0.025% (KENALOG) 0.025 % cream APPLY TO AFFECTED AREA TWICE A DAY FOR 14 DAYS      urea (CARMOL) 40 % Crea Apply topically once daily. (Patient not taking: Reported on 5/9/2023) 185 g 11     No current facility-administered medications for this visit.     Review of patient's allergies indicates:   Allergen Reactions    Donepezil  "Rash    Dilantin [phenytoin sodium extended] Rash       Review of Systems   Musculoskeletal:  Positive for arthralgias, gait problem and joint swelling.   All other systems reviewed and are negative.      Objective:      Vitals:    10/26/23 1444   BP: (!) 110/58   BP Location: Right arm   Patient Position: Sitting   Pulse: 77   Weight: 56.2 kg (124 lb)   Height: 5' 4" (1.626 m)     Physical Exam  Vitals and nursing note reviewed.   Constitutional:       Appearance: Normal appearance.   Cardiovascular:      Pulses:           Dorsalis pedis pulses are 1+ on the right side and 1+ on the left side.        Posterior tibial pulses are 0 on the right side and 0 on the left side.   Pulmonary:      Effort: Pulmonary effort is normal.   Musculoskeletal:         General: Tenderness and deformity present. Normal range of motion.   Feet:      Right foot:      Protective Sensation: 2 sites tested.  2 sites sensed.      Skin integrity: Erythema, callus and dry skin present.      Toenail Condition: Right toenails are abnormally thick, long and ingrown. Fungal disease present.     Left foot:      Protective Sensation: 2 sites tested.  2 sites sensed.      Skin integrity: Erythema, callus and dry skin present.      Toenail Condition: Left toenails are abnormally thick, long and ingrown. Fungal disease present.  Skin:     Capillary Refill: Capillary refill takes more than 3 seconds.      Findings: Erythema present.   Neurological:      General: No focal deficit present.      Mental Status: She is alert.   Psychiatric:         Mood and Affect: Mood normal.         Behavior: Behavior normal.         Thought Content: Thought content normal.         Judgment: Judgment normal.                                                  Assessment:       1. Ingrown nail    2. Peripheral vascular disease    3. Ulcer of both feet, limited to breakdown of skin        Plan:          Patient is complaining of ingrowing toenails on both big toes they have " been causing her discomfort.  A comprehensive new patient evaluation was performed today patient does have significant pain and discomfort on examination of both great toes many of the patient's nails are elongated however the big toes are also ingrowing into both the medial lateral border the causing patient discomfort and at risk for infection.  Patient did not require a nail avulsion at this time I was able to aggressively trim and debride the ingrowing nails which offer the patient relief certainly would not want to undergo a nail avulsion unless absolutely necessary as the patient does have significant peripheral vascular disease which could lead to nonhealing following a nail avulsion.  Patient indicated the areas felt much better following debridement these need to be monitored closely I have recommended the application of Vicks vapor rub twice a day every day which will help combat the fungal infection ultimately the fungal infection has caused the ingrowing toenail which can lead to a bacterial infection.  Recommended follow-up will be as needed patient advised if she is not pain-free from the ingrowing toenail areas where they were trimmed and removed in 4-5 days to contact us for follow-up further evaluation and treatment.   Patient has increasing irritation at the distal aspects of both great toes the left is worse than the right I advised the patient's family member they need to make sure the slippers she is wearing are not too tight clearly she is getting a lot of irritation and with her poor circulation this puts her at considerable risk for ulceration and skin breakdown this is considered a pre ulcerative area.  Debridement of the ingrowing toenails was limited while much of the nail was removed that was causing issues it was limited debridement of the thickness of the nail on both big toes which is certainly contributing to the irritation but the patient did not tolerate even light debridement of  the area very well.  Time including discussion evaluation discussion of treatment options treatment plan new patient evaluation removal of ingrowing toenails and documentation of today's visit equaled 20 minutes.This note was created using [employer] voice recognition software that occasionally misinterpreted phrases or words.

## 2024-01-26 NOTE — TELEPHONE ENCOUNTER
----- Message from Bettie Muniz sent at 1/26/2024  2:34 PM CST -----  Contact: Fiona (daughter)  Type:  RX Refill Request    Who Called: fiona    Refill or New Rx: Refill    RX Name and Strength:linaCLOtide (LINZESS) 145 mcg Cap capsule    How is the patient currently taking it? (ex. 1XDay):as directed    Is this a 30 day or 90 day RX:30    Preferred Pharmacy with phone number:    [facility] - [place] - [address]  [address] [place] [address]  Phone: [phone] Fax: [phone]      Local or Mail Order:local    Ordering Provider:Shikha    Would the patient rather a call back or a response via [facility]? Call back    Best Call Back Number:[phone]    Additional Information: Please call (in afternoons pref) after script send.    Please refill above script    Thanks

## 2024-04-25 ENCOUNTER — OFFICE VISIT (OUTPATIENT)
Dept: PODIATRY | Facility: CLINIC | Age: 89
End: 2024-04-25
Payer: MEDICARE

## 2024-04-25 VITALS
BODY MASS INDEX: 21.15 KG/M2 | DIASTOLIC BLOOD PRESSURE: 61 MMHG | SYSTOLIC BLOOD PRESSURE: 115 MMHG | HEIGHT: 64 IN | HEART RATE: 80 BPM | WEIGHT: 123.88 LBS

## 2024-04-25 DIAGNOSIS — L97.511 ULCER OF BOTH FEET, LIMITED TO BREAKDOWN OF SKIN: Primary | ICD-10-CM

## 2024-04-25 DIAGNOSIS — L60.0 INGROWN NAIL: ICD-10-CM

## 2024-04-25 DIAGNOSIS — I73.9 PERIPHERAL VASCULAR DISEASE: ICD-10-CM

## 2024-04-25 DIAGNOSIS — L97.521 ULCER OF BOTH FEET, LIMITED TO BREAKDOWN OF SKIN: Primary | ICD-10-CM

## 2024-04-25 PROCEDURE — 99213 OFFICE O/P EST LOW 20 MIN: CPT | Mod: S$PBB,,, | Performed by: PODIATRIST

## 2024-04-25 PROCEDURE — 99213 OFFICE O/P EST LOW 20 MIN: CPT | Mod: PBBFAC,PN | Performed by: PODIATRIST

## 2024-04-25 PROCEDURE — 99999 PR PBB SHADOW E&M-EST. PATIENT-LVL III: CPT | Mod: PBBFAC,,, | Performed by: PODIATRIST

## 2024-04-25 RX ORDER — GLUCOSAMINE/CHONDRO SU A 500-400 MG
1 TABLET ORAL 3 TIMES DAILY
COMMUNITY

## 2024-04-25 RX ORDER — CETIRIZINE HYDROCHLORIDE 1 MG/ML
5 SOLUTION ORAL
COMMUNITY
Start: 2023-09-13

## 2024-04-25 RX ORDER — OMEPRAZOLE 20 MG/1
20 CAPSULE, DELAYED RELEASE ORAL
COMMUNITY
Start: 2024-04-10

## 2024-04-28 NOTE — PROGRESS NOTES
Subjective:       Patient ID: Tasha Benson is a 89 y.o. female.    Chief Complaint: Nail Problem         Patient is complaining of ingrowing toenails on both big toes they have been causing her discomfort.  Past Medical History:   Diagnosis Date    Seizures     Unspecified dementia without behavioral disturbance      Past Surgical History:   Procedure Laterality Date    ABDOMINAL HERNIA REPAIR      APPENDECTOMY      HERNIA REPAIR       Family History   Problem Relation Name Age of Onset    Colon cancer Neg Hx      Colon polyps Neg Hx       Social History     Socioeconomic History    Marital status:    Tobacco Use    Smoking status: Former    Smokeless tobacco: Never   Substance and Sexual Activity    Alcohol use: Yes     Alcohol/week: 4.0 standard drinks of alcohol     Types: 4 Glasses of wine per week    Drug use: No    Sexual activity: Not Currently       Current Outpatient Medications   Medication Sig Dispense Refill    calcium-vitamin D 250-100 mg-unit per tablet Take 1 tablet by mouth daily as needed.      cetirizine (CHILDREN'S ZYRTEC ALLERGY) 1 mg/mL syrup 5 mg.      fexofenadine (ALLEGRA) 60 MG tablet Take 60 mg by mouth once daily.      glucosamine-chondroitin 500-400 mg tablet Take 1 tablet by mouth 3 (three) times daily.      Lactobacillus acidophilus (ACIDOPHILUS ORAL) Take by mouth.      linaCLOtide (LINZESS) 72 mcg Cap capsule = 1 cap, Oral, Daily, DO NOT CRUSH OR CHEW., # 30 cap, 0 Refill(s), Maintenance, Pharmacy: [id number], 154, cm, 01/19/23 15:13:00 CST, Height/Length Measured, 57.8, kg, 01/19/23 15:13:00 CST, Weight Dosing      MAGNESIUM CITRATE ORAL Take 500 mg by mouth.      multivitamin (THERAGRAN) per tablet Take 1 tablet by mouth once daily.      omeprazole (PRILOSEC) 20 MG capsule Take 20 mg by mouth.      phenobarbital (LUMINAL) 30 MG tablet Take 120 mg by mouth every evening.         No current facility-administered medications for this visit.     Review of patient's  "allergies indicates:   Allergen Reactions    Donepezil Rash    Dilantin [phenytoin sodium extended] Rash       Review of Systems   Musculoskeletal:  Positive for arthralgias, gait problem and joint swelling.   All other systems reviewed and are negative.      Objective:      Vitals:    04/25/24 1427   BP: 115/61   BP Location: Right arm   Patient Position: Sitting   Pulse: 80   Weight: 56.2 kg (123 lb 14.4 oz)   Height: 5' 4" (1.626 m)     Physical Exam  Vitals and nursing note reviewed.   Constitutional:       Appearance: Normal appearance.   Cardiovascular:      Pulses:           Dorsalis pedis pulses are 1+ on the right side and 1+ on the left side.        Posterior tibial pulses are 0 on the right side and 0 on the left side.   Pulmonary:      Effort: Pulmonary effort is normal.   Musculoskeletal:         General: Tenderness and deformity present. Normal range of motion.   Feet:      Right foot:      Protective Sensation: 2 sites tested.  2 sites sensed.      Skin integrity: Erythema, callus and dry skin present.      Toenail Condition: Right toenails are abnormally thick, long and ingrown. Fungal disease present.     Left foot:      Protective Sensation: 2 sites tested.  2 sites sensed.      Skin integrity: Erythema, callus and dry skin present.      Toenail Condition: Left toenails are abnormally thick, long and ingrown. Fungal disease present.  Skin:     Capillary Refill: Capillary refill takes more than 3 seconds.      Findings: Erythema present.   Neurological:      General: No focal deficit present.      Mental Status: She is alert.   Psychiatric:         Mood and Affect: Mood normal.         Behavior: Behavior normal.         Thought Content: Thought content normal.         Judgment: Judgment normal.                                                                        Assessment:       1. Ulcer of both feet, limited to breakdown of skin    2. Ingrown nail    3. Peripheral vascular disease        Plan: "          Patient is complaining of ingrowing toenails on both big toes they have been causing her discomfort.  A comprehensive new patient evaluation was performed today patient does have significant pain and discomfort on examination of both great toes many of the patient's nails are elongated however the big toes are also ingrowing into both the medial lateral border the causing patient discomfort and at risk for infection.  Patient did not require a nail avulsion at this time I was able to aggressively trim and debride the ingrowing nails which offer the patient relief certainly would not want to undergo a nail avulsion unless absolutely necessary as the patient does have significant peripheral vascular disease which could lead to nonhealing following a nail avulsion.  Patient indicated the areas felt much better following debridement these need to be monitored closely I have recommended the application of Vicks vapor rub twice a day every day which will help combat the fungal infection ultimately the fungal infection has caused the ingrowing toenail which can lead to a bacterial infection.  Recommended follow-up will be as needed patient advised if she is not pain-free from the ingrowing toenail areas where they were trimmed and removed in 4-5 days to contact us for follow-up further evaluation and treatment.   Patient has increasing irritation at the distal aspects of both great toes the left is worse than the right I advised the patient's family member they need to make sure the slippers she is wearing are not too tight clearly she is getting a lot of irritation and with her poor circulation this puts her at considerable risk for ulceration and skin breakdown this is considered a pre ulcerative area.  Debridement of the ingrowing toenails was limited while much of the nail was removed that was causing issues it was limited debridement of the thickness of the nail on both big toes which is certainly  contributing to the irritation but the patient did not tolerate even light debridement of the area very well.  Time including discussion evaluation discussion of treatment options treatment plan new patient evaluation removal of ingrowing toenails and documentation of today's visit equaled 20 minutes.This note was created using [email or web address] voice recognition software that occasionally misinterpreted phrases or words.

## 2024-10-29 ENCOUNTER — OFFICE VISIT (OUTPATIENT)
Dept: PODIATRY | Facility: CLINIC | Age: 89
End: 2024-10-29
Payer: MEDICARE

## 2024-10-29 VITALS
WEIGHT: 123.88 LBS | HEIGHT: 64 IN | BODY MASS INDEX: 21.15 KG/M2 | SYSTOLIC BLOOD PRESSURE: 114 MMHG | DIASTOLIC BLOOD PRESSURE: 67 MMHG | HEART RATE: 72 BPM

## 2024-10-29 DIAGNOSIS — L60.0 INGROWN NAIL: Primary | ICD-10-CM

## 2024-10-29 DIAGNOSIS — L97.511 ULCER OF BOTH FEET, LIMITED TO BREAKDOWN OF SKIN: ICD-10-CM

## 2024-10-29 DIAGNOSIS — I73.9 PERIPHERAL VASCULAR DISEASE: ICD-10-CM

## 2024-10-29 DIAGNOSIS — L97.521 ULCER OF BOTH FEET, LIMITED TO BREAKDOWN OF SKIN: ICD-10-CM

## 2024-10-29 PROCEDURE — 99213 OFFICE O/P EST LOW 20 MIN: CPT | Mod: S$PBB,,, | Performed by: PODIATRIST

## 2024-10-29 PROCEDURE — 99214 OFFICE O/P EST MOD 30 MIN: CPT | Mod: PBBFAC,PN | Performed by: PODIATRIST

## 2024-10-29 PROCEDURE — 99999 PR PBB SHADOW E&M-EST. PATIENT-LVL IV: CPT | Mod: PBBFAC,,, | Performed by: PODIATRIST

## 2025-03-27 ENCOUNTER — TELEPHONE (OUTPATIENT)
Dept: PODIATRY | Facility: CLINIC | Age: OVER 89
End: 2025-03-27
Payer: MEDICARE

## 2025-03-27 NOTE — TELEPHONE ENCOUNTER
----- Message from Luis A sent at 3/27/2025  1:33 PM CDT -----  Type:  Needs Medical AdviceWho Called: pts daughters, Sahra and Fiona Symptoms (please be specific): Needs more regular Nail Trimmings for her momWould the patient rather a call back or a response via [facility]? Call Deedee Call Back Number: [phone] Information: Mom needs additional nail trimmings more frequently than Medicare allows, could someone give them a cash price pls?  Please call back to advise. Thanks!

## 2025-04-16 DIAGNOSIS — R19.7 DIARRHEA, UNSPECIFIED TYPE: Primary | ICD-10-CM

## 2025-04-29 ENCOUNTER — OFFICE VISIT (OUTPATIENT)
Dept: PODIATRY | Facility: CLINIC | Age: OVER 89
End: 2025-04-29
Payer: MEDICARE

## 2025-04-29 VITALS
HEIGHT: 64 IN | SYSTOLIC BLOOD PRESSURE: 122 MMHG | WEIGHT: 123.88 LBS | BODY MASS INDEX: 21.15 KG/M2 | DIASTOLIC BLOOD PRESSURE: 71 MMHG | HEART RATE: 72 BPM

## 2025-04-29 DIAGNOSIS — I73.9 PERIPHERAL VASCULAR DISEASE: ICD-10-CM

## 2025-04-29 DIAGNOSIS — L60.0 INGROWN NAIL: ICD-10-CM

## 2025-04-29 DIAGNOSIS — L97.521 ULCER OF BOTH FEET, LIMITED TO BREAKDOWN OF SKIN: Primary | ICD-10-CM

## 2025-04-29 DIAGNOSIS — L97.511 ULCER OF BOTH FEET, LIMITED TO BREAKDOWN OF SKIN: Primary | ICD-10-CM

## 2025-04-29 PROCEDURE — 99213 OFFICE O/P EST LOW 20 MIN: CPT | Mod: PBBFAC,PN | Performed by: PODIATRIST

## 2025-04-29 PROCEDURE — 99999 PR PBB SHADOW E&M-EST. PATIENT-LVL III: CPT | Mod: PBBFAC,,, | Performed by: PODIATRIST

## 2025-04-29 PROCEDURE — 99213 OFFICE O/P EST LOW 20 MIN: CPT | Mod: S$PBB,,, | Performed by: PODIATRIST

## 2025-05-01 NOTE — PROGRESS NOTES
Subjective:       Patient ID: Tasha Benson is a 89 y.o. female.    Chief Complaint: Ingrown Toenail         Patient is complaining of ingrowing toenails on both big toes they have been causing her discomfort.  Past Medical History:   Diagnosis Date    Seizures     Unspecified dementia without behavioral disturbance      Past Surgical History:   Procedure Laterality Date    ABDOMINAL HERNIA REPAIR      APPENDECTOMY      HERNIA REPAIR       Family History   Problem Relation Name Age of Onset    Colon cancer Neg Hx      Colon polyps Neg Hx       Social History     Socioeconomic History    Marital status:    Tobacco Use    Smoking status: Former    Smokeless tobacco: Never   Substance and Sexual Activity    Alcohol use: Yes     Alcohol/week: 4.0 standard drinks of alcohol     Types: 4 Glasses of wine per week    Drug use: No    Sexual activity: Not Currently       Current Outpatient Medications   Medication Sig Dispense Refill    calcium-vitamin D 250-100 mg-unit per tablet Take 1 tablet by mouth daily as needed.      cetirizine (CHILDREN'S ZYRTEC ALLERGY) 1 mg/mL syrup 5 mg.      fexofenadine (ALLEGRA) 60 MG tablet Take 60 mg by mouth once daily.      glucosamine-chondroitin 500-400 mg tablet Take 1 tablet by mouth 3 (three) times daily.      Lactobacillus acidophilus (ACIDOPHILUS ORAL) Take by mouth.      linaCLOtide (LINZESS) 145 mcg Cap capsule Take 1 capsule (145 mcg total) by mouth before breakfast. 90 capsule 3    MAGNESIUM CITRATE ORAL Take 500 mg by mouth.      multivitamin (THERAGRAN) per tablet Take 1 tablet by mouth once daily.      omeprazole (PRILOSEC) 20 MG capsule Take 20 mg by mouth.      phenobarbital (LUMINAL) 30 MG tablet Take 120 mg by mouth every evening.         No current facility-administered medications for this visit.     Review of patient's allergies indicates:   Allergen Reactions    Donepezil Rash    Dilantin [phenytoin sodium extended] Rash       Review of Systems  "  Musculoskeletal:  Positive for arthralgias, gait problem and joint swelling.   All other systems reviewed and are negative.      Objective:      Vitals:    04/29/25 1505   BP: 122/71   Pulse: 72   Weight: 56.2 kg (123 lb 14.4 oz)   Height: 5' 4" (1.626 m)     Physical Exam  Vitals and nursing note reviewed.   Constitutional:       Appearance: Normal appearance.   Cardiovascular:      Pulses:           Dorsalis pedis pulses are 1+ on the right side and 1+ on the left side.        Posterior tibial pulses are 0 on the right side and 0 on the left side.   Pulmonary:      Effort: Pulmonary effort is normal.   Musculoskeletal:         General: Tenderness and deformity present. Normal range of motion.   Feet:      Right foot:      Protective Sensation: 2 sites tested.  2 sites sensed.      Skin integrity: Erythema, callus and dry skin present.      Toenail Condition: Right toenails are abnormally thick, long and ingrown. Fungal disease present.     Left foot:      Protective Sensation: 2 sites tested.  2 sites sensed.      Skin integrity: Erythema, callus and dry skin present.      Toenail Condition: Left toenails are abnormally thick, long and ingrown. Fungal disease present.  Skin:     Capillary Refill: Capillary refill takes more than 3 seconds.      Findings: Erythema present.   Neurological:      General: No focal deficit present.      Mental Status: She is alert.   Psychiatric:         Mood and Affect: Mood normal.         Behavior: Behavior normal.         Thought Content: Thought content normal.         Judgment: Judgment normal.                          Assessment:       1. Ulcer of both feet, limited to breakdown of skin    2. Ingrown nail    3. Peripheral vascular disease        Plan:          Patient is complaining of ingrowing toenails on both big toes they have been causing her discomfort.  A comprehensive new patient evaluation was performed today patient does have significant pain and discomfort on " examination of both great toes many of the patient's nails are elongated however the big toes are also ingrowing into both the medial lateral border the causing patient discomfort and at risk for infection.  Patient did not require a nail avulsion at this time I was able to aggressively trim and debride the ingrowing nails which offer the patient relief certainly would not want to undergo a nail avulsion unless absolutely necessary as the patient does have significant peripheral vascular disease which could lead to nonhealing following a nail avulsion.  Patient indicated the areas felt much better following debridement these need to be monitored closely I have recommended the application of Vicks vapor rub twice a day every day which will help combat the fungal infection ultimately the fungal infection has caused the ingrowing toenail which can lead to a bacterial infection.  Recommended follow-up will be as needed patient advised if she is not pain-free from the ingrowing toenail areas where they were trimmed and removed in 4-5 days to contact us for follow-up further evaluation and treatment.   Patient has increasing irritation at the distal aspects of both great toes the left is worse than the right I advised the patient's family member they need to make sure the slippers she is wearing are not too tight clearly she is getting a lot of irritation and with her poor circulation this puts her at considerable risk for ulceration and skin breakdown this is considered a pre ulcerative area.  Debridement of the ingrowing toenails was limited while much of the nail was removed that was causing issues it was limited debridement of the thickness of the nail on both big toes which is certainly contributing to the irritation but the patient did not tolerate even light debridement of the area very well.  Time including discussion evaluation discussion of treatment options treatment plan new patient evaluation removal of  ingrowing toenails and documentation of today's visit equaled 20 minutes.This note was created using [employer] voice recognition software that occasionally misinterpreted phrases or words.

## 2025-07-29 ENCOUNTER — OFFICE VISIT (OUTPATIENT)
Dept: PODIATRY | Facility: CLINIC | Age: OVER 89
End: 2025-07-29
Payer: MEDICARE

## 2025-07-29 VITALS — BODY MASS INDEX: 21.15 KG/M2 | WEIGHT: 123.88 LBS | HEIGHT: 64 IN

## 2025-07-29 DIAGNOSIS — L97.521 ULCER OF BOTH FEET, LIMITED TO BREAKDOWN OF SKIN: ICD-10-CM

## 2025-07-29 DIAGNOSIS — L97.511 ULCER OF BOTH FEET, LIMITED TO BREAKDOWN OF SKIN: ICD-10-CM

## 2025-07-29 DIAGNOSIS — L60.0 INGROWN NAIL: Primary | ICD-10-CM

## 2025-07-29 DIAGNOSIS — I73.9 PERIPHERAL VASCULAR DISEASE: ICD-10-CM

## 2025-07-29 PROCEDURE — 99999 PR PBB SHADOW E&M-EST. PATIENT-LVL III: CPT | Mod: PBBFAC,,, | Performed by: PODIATRIST

## 2025-07-29 PROCEDURE — 99213 OFFICE O/P EST LOW 20 MIN: CPT | Mod: PBBFAC,PN | Performed by: PODIATRIST

## 2025-07-29 PROCEDURE — 99213 OFFICE O/P EST LOW 20 MIN: CPT | Mod: S$PBB,,, | Performed by: PODIATRIST

## 2025-07-31 NOTE — PROGRESS NOTES
Subjective:       Patient ID: Tasha Benson is a 89 y.o. female.    Chief Complaint: Foot Ulcer         Patient is complaining of ingrowing toenails on both big toes they have been causing her discomfort.  Past Medical History:   Diagnosis Date    Seizures     Unspecified dementia without behavioral disturbance      Past Surgical History:   Procedure Laterality Date    ABDOMINAL HERNIA REPAIR      APPENDECTOMY      HERNIA REPAIR       Family History   Problem Relation Name Age of Onset    Colon cancer Neg Hx      Colon polyps Neg Hx       Social History     Socioeconomic History    Marital status:    Tobacco Use    Smoking status: Former    Smokeless tobacco: Never   Substance and Sexual Activity    Alcohol use: Yes     Alcohol/week: 4.0 standard drinks of alcohol     Types: 4 Glasses of wine per week    Drug use: No    Sexual activity: Not Currently       Current Outpatient Medications   Medication Sig Dispense Refill    calcium-vitamin D 250-100 mg-unit per tablet Take 1 tablet by mouth daily as needed.      cetirizine (CHILDREN'S ZYRTEC ALLERGY) 1 mg/mL syrup 5 mg.      fexofenadine (ALLEGRA) 60 MG tablet Take 60 mg by mouth once daily.      glucosamine-chondroitin 500-400 mg tablet Take 1 tablet by mouth 3 (three) times daily.      Lactobacillus acidophilus (ACIDOPHILUS ORAL) Take by mouth.      linaCLOtide (LINZESS) 145 mcg Cap capsule Take 1 capsule (145 mcg total) by mouth before breakfast. 90 capsule 3    MAGNESIUM CITRATE ORAL Take 500 mg by mouth.      multivitamin (THERAGRAN) per tablet Take 1 tablet by mouth once daily.      omeprazole (PRILOSEC) 20 MG capsule Take 20 mg by mouth.      phenobarbital (LUMINAL) 30 MG tablet Take 120 mg by mouth every evening.         No current facility-administered medications for this visit.     Review of patient's allergies indicates:   Allergen Reactions    Donepezil Rash    Dilantin [phenytoin sodium extended] Rash       Review of Systems   Musculoskeletal:   "Positive for arthralgias, gait problem and joint swelling.   All other systems reviewed and are negative.      Objective:      Vitals:    07/29/25 1530   Weight: 56.2 kg (123 lb 14.4 oz)   Height: 5' 4" (1.626 m)     Physical Exam  Vitals and nursing note reviewed.   Constitutional:       Appearance: Normal appearance.   Cardiovascular:      Pulses:           Dorsalis pedis pulses are 1+ on the right side and 1+ on the left side.        Posterior tibial pulses are 0 on the right side and 0 on the left side.   Pulmonary:      Effort: Pulmonary effort is normal.   Musculoskeletal:         General: Tenderness and deformity present. Normal range of motion.   Feet:      Right foot:      Protective Sensation: 2 sites tested.  2 sites sensed.      Skin integrity: Erythema, callus and dry skin present.      Toenail Condition: Right toenails are abnormally thick, long and ingrown. Fungal disease present.     Left foot:      Protective Sensation: 2 sites tested.  2 sites sensed.      Skin integrity: Erythema, callus and dry skin present.      Toenail Condition: Left toenails are abnormally thick, long and ingrown. Fungal disease present.  Skin:     Capillary Refill: Capillary refill takes more than 3 seconds.      Findings: Erythema present.   Neurological:      General: No focal deficit present.      Mental Status: She is alert.   Psychiatric:         Mood and Affect: Mood normal.         Behavior: Behavior normal.         Thought Content: Thought content normal.         Judgment: Judgment normal.                                Assessment:       1. Ingrown nail    2. Peripheral vascular disease    3. Ulcer of both feet, limited to breakdown of skin        Plan:          Patient is complaining of ingrowing toenails on both big toes they have been causing her discomfort.  A comprehensive new patient evaluation was performed today patient does have significant pain and discomfort on examination of both great toes many of the " patient's nails are elongated however the big toes are also ingrowing into both the medial lateral border the causing patient discomfort and at risk for infection.  Patient did not require a nail avulsion at this time I was able to aggressively trim and debride the ingrowing nails which offer the patient relief certainly would not want to undergo a nail avulsion unless absolutely necessary as the patient does have significant peripheral vascular disease which could lead to nonhealing following a nail avulsion.  Patient indicated the areas felt much better following debridement these need to be monitored closely I have recommended the application of Vicks vapor rub twice a day every day which will help combat the fungal infection ultimately the fungal infection has caused the ingrowing toenail which can lead to a bacterial infection.  Recommended follow-up will be as needed patient advised if she is not pain-free from the ingrowing toenail areas where they were trimmed and removed in 4-5 days to contact us for follow-up further evaluation and treatment.  Patient has increasing irritation at the distal aspects of both great toes the left is worse than the right I advised the patient's family member they need to make sure the slippers she is wearing are not too tight clearly she is getting a lot of irritation and with her poor circulation this puts her at considerable risk for ulceration and skin breakdown this is considered a pre ulcerative area.  Debridement of the ingrowing toenails was limited while much of the nail was removed that was causing issues it was limited debridement of the thickness of the nail on both big toes which is certainly contributing to the irritation but the patient did not tolerate even light debridement of the area very well.  Time including discussion evaluation discussion of treatment options treatment plan new patient evaluation removal of ingrowing toenails and documentation of today's  visit equaled 20 minutes.  Vicks vapor rub does show signs of helping to soften the nail to better address the fungal infection.  This note was created using [employer] voice recognition software that occasionally misinterpreted phrases or words.